# Patient Record
Sex: FEMALE | Race: WHITE | Employment: OTHER | URBAN - METROPOLITAN AREA
[De-identification: names, ages, dates, MRNs, and addresses within clinical notes are randomized per-mention and may not be internally consistent; named-entity substitution may affect disease eponyms.]

---

## 2017-11-15 ENCOUNTER — HOSPITAL ENCOUNTER (OUTPATIENT)
Dept: MAMMOGRAPHY | Facility: CLINIC | Age: 82
Discharge: HOME | End: 2017-11-15
Attending: FAMILY MEDICINE | Admitting: FAMILY MEDICINE
Payer: MEDICARE

## 2017-11-15 DIAGNOSIS — Z12.31 VISIT FOR SCREENING MAMMOGRAM: ICD-10-CM

## 2017-11-15 PROCEDURE — 77063 BREAST TOMOSYNTHESIS BI: CPT

## 2017-11-15 PROCEDURE — G0202 SCR MAMMO BI INCL CAD: HCPCS

## 2019-02-03 ENCOUNTER — APPOINTMENT (OUTPATIENT)
Dept: GENERAL RADIOLOGY | Facility: CLINIC | Age: 84
End: 2019-02-03
Payer: COMMERCIAL

## 2019-02-03 ENCOUNTER — HOSPITAL ENCOUNTER (EMERGENCY)
Facility: CLINIC | Age: 84
Discharge: HOME | End: 2019-02-03
Attending: EMERGENCY MEDICINE | Admitting: EMERGENCY MEDICINE
Payer: COMMERCIAL

## 2019-02-03 VITALS
SYSTOLIC BLOOD PRESSURE: 154 MMHG | BODY MASS INDEX: 20.55 KG/M2 | HEART RATE: 58 BPM | RESPIRATION RATE: 16 BRPM | TEMPERATURE: 97.9 F | WEIGHT: 116 LBS | DIASTOLIC BLOOD PRESSURE: 73 MMHG | OXYGEN SATURATION: 100 % | HEIGHT: 63 IN
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
## 2019-02-03 DIAGNOSIS — S22.32XA CLOSED FRACTURE OF ONE RIB OF LEFT SIDE, INITIAL ENCOUNTER: ICD-10-CM

## 2019-02-03 LAB
ALBUMIN SERPL-MCNC: 3.5 G/DL (ref 3.4–5)
ALBUMIN UR-MCNC: NEGATIVE MG/DL
ALP SERPL-CCNC: 37 U/L (ref 40–150)
ALT SERPL W P-5'-P-CCNC: 20 U/L (ref 0–50)
ANION GAP SERPL CALCULATED.3IONS-SCNC: 8 MMOL/L (ref 3–14)
APPEARANCE UR: CLEAR
AST SERPL W P-5'-P-CCNC: 11 U/L (ref 0–45)
BASOPHILS # BLD AUTO: 0 10E9/L (ref 0–0.2)
BASOPHILS NFR BLD AUTO: 0.2 %
BILIRUB SERPL-MCNC: 0.4 MG/DL (ref 0.2–1.3)
BILIRUB UR QL STRIP: NEGATIVE
BUN SERPL-MCNC: 14 MG/DL (ref 7–30)
CALCIUM SERPL-MCNC: 8.4 MG/DL (ref 8.5–10.1)
CHLORIDE SERPL-SCNC: 107 MMOL/L (ref 94–109)
CO2 SERPL-SCNC: 25 MMOL/L (ref 20–32)
COLOR UR AUTO: YELLOW
CREAT SERPL-MCNC: 0.69 MG/DL (ref 0.52–1.04)
DIFFERENTIAL METHOD BLD: NORMAL
EOSINOPHIL # BLD AUTO: 0.2 10E9/L (ref 0–0.7)
EOSINOPHIL NFR BLD AUTO: 2.5 %
ERYTHROCYTE [DISTWIDTH] IN BLOOD BY AUTOMATED COUNT: 13.9 % (ref 10–15)
GFR SERPL CREATININE-BSD FRML MDRD: 79 ML/MIN/{1.73_M2}
GLUCOSE SERPL-MCNC: 116 MG/DL (ref 70–99)
GLUCOSE UR STRIP-MCNC: NEGATIVE MG/DL
HCT VFR BLD AUTO: 35.8 % (ref 35–47)
HGB BLD-MCNC: 12 G/DL (ref 11.7–15.7)
HGB UR QL STRIP: NEGATIVE
HYALINE CASTS #/AREA URNS LPF: 1 /LPF (ref 0–2)
IMM GRANULOCYTES # BLD: 0 10E9/L (ref 0–0.4)
IMM GRANULOCYTES NFR BLD: 0.4 %
KETONES UR STRIP-MCNC: NEGATIVE MG/DL
LEUKOCYTE ESTERASE UR QL STRIP: ABNORMAL
LYMPHOCYTES # BLD AUTO: 1.9 10E9/L (ref 0.8–5.3)
LYMPHOCYTES NFR BLD AUTO: 23 %
MCH RBC QN AUTO: 29.7 PG (ref 26.5–33)
MCHC RBC AUTO-ENTMCNC: 33.5 G/DL (ref 31.5–36.5)
MCV RBC AUTO: 89 FL (ref 78–100)
MONOCYTES # BLD AUTO: 0.9 10E9/L (ref 0–1.3)
MONOCYTES NFR BLD AUTO: 11 %
MUCOUS THREADS #/AREA URNS LPF: PRESENT /LPF
NEUTROPHILS # BLD AUTO: 5.2 10E9/L (ref 1.6–8.3)
NEUTROPHILS NFR BLD AUTO: 62.9 %
NITRATE UR QL: NEGATIVE
NRBC # BLD AUTO: 0 10*3/UL
NRBC BLD AUTO-RTO: 0 /100
PH UR STRIP: 7 PH (ref 5–7)
PLATELET # BLD AUTO: 237 10E9/L (ref 150–450)
POTASSIUM SERPL-SCNC: 3.1 MMOL/L (ref 3.4–5.3)
PROT SERPL-MCNC: 6.8 G/DL (ref 6.8–8.8)
RBC # BLD AUTO: 4.04 10E12/L (ref 3.8–5.2)
RBC #/AREA URNS AUTO: <1 /HPF (ref 0–2)
SODIUM SERPL-SCNC: 140 MMOL/L (ref 133–144)
SOURCE: ABNORMAL
SP GR UR STRIP: 1.01 (ref 1–1.03)
SQUAMOUS #/AREA URNS AUTO: <1 /HPF (ref 0–1)
UROBILINOGEN UR STRIP-MCNC: NORMAL MG/DL (ref 0–2)
WBC # BLD AUTO: 8.3 10E9/L (ref 4–11)
WBC #/AREA URNS AUTO: 3 /HPF (ref 0–5)

## 2019-02-03 PROCEDURE — 85025 COMPLETE CBC W/AUTO DIFF WBC: CPT | Performed by: EMERGENCY MEDICINE

## 2019-02-03 PROCEDURE — 81001 URINALYSIS AUTO W/SCOPE: CPT | Performed by: EMERGENCY MEDICINE

## 2019-02-03 PROCEDURE — 99285 EMERGENCY DEPT VISIT HI MDM: CPT | Mod: 25

## 2019-02-03 PROCEDURE — 76705 ECHO EXAM OF ABDOMEN: CPT

## 2019-02-03 PROCEDURE — A9270 NON-COVERED ITEM OR SERVICE: HCPCS | Mod: GY | Performed by: EMERGENCY MEDICINE

## 2019-02-03 PROCEDURE — 80053 COMPREHEN METABOLIC PANEL: CPT | Performed by: EMERGENCY MEDICINE

## 2019-02-03 PROCEDURE — 71046 X-RAY EXAM CHEST 2 VIEWS: CPT

## 2019-02-03 PROCEDURE — 25000132 ZZH RX MED GY IP 250 OP 250 PS 637: Mod: GY | Performed by: EMERGENCY MEDICINE

## 2019-02-03 PROCEDURE — 73502 X-RAY EXAM HIP UNI 2-3 VIEWS: CPT

## 2019-02-03 RX ORDER — HYDROCODONE BITARTRATE AND ACETAMINOPHEN 5; 325 MG/1; MG/1
1-2 TABLET ORAL EVERY 4 HOURS PRN
Qty: 15 TABLET | Refills: 0 | Status: SHIPPED | OUTPATIENT
Start: 2019-02-03 | End: 2019-03-05

## 2019-02-03 RX ORDER — OXYCODONE HYDROCHLORIDE 5 MG/1
5 TABLET ORAL ONCE
Status: DISCONTINUED | OUTPATIENT
Start: 2019-02-03 | End: 2019-02-03 | Stop reason: HOSPADM

## 2019-02-03 RX ORDER — OXYCODONE AND ACETAMINOPHEN 5; 325 MG/1; MG/1
1 TABLET ORAL ONCE
Status: COMPLETED | OUTPATIENT
Start: 2019-02-03 | End: 2019-02-03

## 2019-02-03 RX ORDER — ACETAMINOPHEN 325 MG/1
325 TABLET ORAL ONCE
Status: COMPLETED | OUTPATIENT
Start: 2019-02-03 | End: 2019-02-03

## 2019-02-03 RX ORDER — ACETAMINOPHEN 325 MG/1
325 TABLET ORAL ONCE
Status: DISCONTINUED | OUTPATIENT
Start: 2019-02-03 | End: 2019-02-03 | Stop reason: HOSPADM

## 2019-02-03 RX ADMIN — OXYCODONE HYDROCHLORIDE AND ACETAMINOPHEN 1 TABLET: 5; 325 TABLET ORAL at 08:16

## 2019-02-03 RX ADMIN — ACETAMINOPHEN 325 MG: 325 TABLET, FILM COATED ORAL at 08:16

## 2019-02-03 ASSESSMENT — ENCOUNTER SYMPTOMS
WEAKNESS: 0
NUMBNESS: 0
BACK PAIN: 0
ARTHRALGIAS: 1
MYALGIAS: 1
WOUND: 0
NECK PAIN: 0
HEADACHES: 0
ABDOMINAL PAIN: 1

## 2019-02-03 ASSESSMENT — MIFFLIN-ST. JEOR: SCORE: 940.3

## 2019-02-03 NOTE — DISCHARGE INSTRUCTIONS
Should apply ice to your back for comfort.  You should use the Norco as prescribed for severe pain.  You can also add in 325-650 mg of Tylenol up to every 6 hours.  He should return to the emergency department if you have increasing pain, fevers, trouble breathing or feel dizzy or lightheaded.  Otherwise, you can make an appointment follow-up with your primary care doctor for recheck.

## 2019-02-03 NOTE — ED AVS SNAPSHOT
Emergency Department  [address] [facility] [place] [address]  Phone:  [phone]  Fax:  [phone]                                    Verito Chicas   MRN: [id number]    Department:   Emergency Department   Date of Visit:  2/3/2019           After Visit Summary Signature Page    I have received my discharge instructions, and my questions have been answered. I have discussed any challenges I see with this plan with the nurse or doctor.    ..........................................................................................................................................  Patient/Patient Representative Signature      ..........................................................................................................................................  Patient Representative Print Name and Relationship to Patient    ..................................................               ................................................  Date                                   Time    ..........................................................................................................................................  Reviewed by Signature/Title    ...................................................              ..............................................  Date                                               Time          22EPIC Rev 08/18

## 2019-02-03 NOTE — ED PROVIDER NOTES
"  History     Chief Complaint:  Back Pain      HPI   Verito Chicas is a 85 year old female who presents with fall and left hip pain. The patient states that Friday, 3 days ago now, she was walking up her basement stairs when she stumbled and fell forward into the stairs, landing on her left hip and side primarily. She denies hitting her head or losing consciousness but since the incident has had a left upper hip/side pain that hurts whenever she ambulates. The patient otherwise denies any pain in her neck, head, or back. She has no numbness or weakness in the leg and has still been able to walk albeit with some pain.    Allergies:  No known drug allergies     Medications:    Amlodipine  Cymbalta  Hydrochlorothiazide  Labetalol  Lisinopril   Omeprazole  Simvastatin     Past Medical History:    GERD  Hypertension     Past Surgical History:    Appendectomy  Right breast lumpectomy  Dental extraction  Cataract surgery bilateral  Left knee arthroscopy  Left shoulder surgery  Left wrist surgery     Family History:    Colon cancer     Social History:  Smoking Status: Light tobacco Smoker  Alcohol Use: No  Patient presents with .   Marital Status:       Review of Systems   Gastrointestinal: Positive for abdominal pain.   Musculoskeletal: Positive for arthralgias, gait problem and myalgias. Negative for back pain and neck pain.   Skin: Negative for wound.   Neurological: Negative for syncope, weakness, numbness and headaches.   All other systems reviewed and are negative.      Physical Exam   First Vitals:  BP: 170/78  Pulse: 58  Heart Rate: 76  Temp: 97.9  F (36.6  C)  Resp: 16  Height: 160 cm (5' 3\")  Weight: 52.6 kg (116 lb)  SpO2: 96 %      Physical Exam  Constitutional: Alert, attentive, GCS 15  HENT:    Nose: Nose normal.    Mouth/Throat: Oropharynx is clear, mucous membranes are moist  Eyes: EOM are normal, anicteric, conjugate gaze  CV: regular rate and rhythm; no murmurs  Chest: Effort normal " and breath sounds clear without wheezing or rales, symmetric bilaterally   GI:  non tender. No distension. No guarding or rebound.    MSK: Initially tender on the left iliac crest and lateral hip, on repeat evaluation, more tender along the left paraspinal muscle and left last lower posterior rib.  No knee tenderness, passive range of motion of the left hip full.  No midline spinal tenderness.  Neurological: Alert, attentive, moving all extremities equally.   Skin: Skin is warm and dry.      Emergency Department Course     Imaging:  Radiographic findings were communicated with the patient who voiced understanding of the findings.    X-ray Pelvis and Left hip, 1 view:  No evidence of femur or femoral neck fracture. No pelvic  fracture. Degenerative changes in the lower lumbar spine.  Result per radiology.      X-ray Chest, 2 views:  Heart size is upper normal. Pulmonary vasculature is not  distended. No focal infiltrates or evidence of pleural fluid. No  pneumothorax. There is rib irregularity involving the left posterior  ninth rib. This is likely an old healed rib fracture that was seen on  the 2009 comparison study. No evidence of acute fracture.  Result per radiology.     Laboratory:  CBC: WNL (WBC 8.3, HGB 12.0, )   CMP: K 3.1 (L), Glucose 116 (H), Ca 8.4 (L), Alkphos 37 (L), o/w WNL (Creatinine 0.69)   UA: o/w negative    Procedures:    PROCEDURE NOTE: ED BEDSIDE LIMITED ULTRASOUND  Name of the study: E-FAST Exam  Performed by: Feng Maldonado MD  Indications:Blunt Trauma   Body Location / Organs Imaged: Three quadrants (perihepatic, perisplenic, pelvic) of the abdomen were scanned.  Findings: Three quadrants were negative for free fluid.    Interpretation: No evidence of acute hemoperitoneum.  Archiving of images: Images were saved digitally to the internal hard drive of the ultrasound machine/PACS.  Interventions:  0816 - Tylenol 325 mg PO  0816 - Percocet 5-325 mg tablet, 1 tablet PO     Emergency  Department Course:  Past medical records, nursing notes, and vitals reviewed.  0807: I performed an exam of the patient and obtained history, as documented above.      IV inserted and blood drawn.     The patient was sent for a X-ray while in the emergency department, findings above.      1035: I rechecked patient and explained findings.    1248: Bedside ultrasound obtained as noted above.    1255: I rechecked the patient. Findings and plan explained to the Patient. Patient discharged home with instructions regarding supportive care, medications, and reasons to return. The importance of close follow-up was reviewed.      Impression & Plan      Medical Decision Makin-year-old woman with past medical history significant for high blood pressure, hyperlipidemia presenting for evaluation of left side pain after mechanical fall 2 days prior in which she was walking up the stairs, lost her footing and landed on her left side against the stairs.  No LOC, has no neck pain or range of motion.  Initially her exam was concerning for possible left hip or pelvis injury though fracture seems less likely given patient has been able to walk without much difficulty over the last 2 days mainly reports pain with getting in and out of bed or standing from seated position.  Initial x-rays of the pelvis were negative and on repeat examination patient actually was able to better localize her pain while standing to the posterior left lower rib cage approximately rib 11.  Chest x-ray shows no evidence of pneumothorax and no obvious rib deformity so possible old healing rib fractures.  I did perform a bedside ultrasound after her urine was negative for blood which showed no evidence of free fluid and no overt subcapsular splenic injury.  I was able to isolate a small deformity in the posterior ribs suggestive of a rib fracture which fits clinically with her presentation especially given her negative hip and pelvis x-rays.  I recommended  a short course of pain medications and follow-up with her primary care doctor for recheck.  Return precautions were reviewed including increasing abdominal pain, fevers, dizziness or lightheadedness or chest pain, chest pressure or trouble breathing.  Patient and her  expressed understanding of this plan.  Patient was ambulatory with a steady gait without assistance at time of discharge.    Diagnosis:    ICD-10-CM    1. Closed fracture of one rib of left side, initial encounter S22.32XA    2. Fall, initial encounter W19.XXXA      Discharge Medications:     HYDROcodone-acetaminophen 5-325 MG tablet  Commonly known as:  NORCO  1-2 tablets, Oral, EVERY 4 HOURS PRN  What changed:      reasons to take this    Another medication with the same name was removed. Continue taking this medication, and follow the directions you see here.          Feng Maldonado MD   [employer]  9:36 AM 02/04/19       eMkhi Douglas  2/3/2019    EMERGENCY DEPARTMENT  IMekhi, brandyn serving as a scribe at 8:07 AM on 2/3/2019 to document services personally performed by Feng Maldonado MD based on my observations and the provider's statements to me.       Feng Maldonado MD  02/04/19 0936

## 2019-03-11 ENCOUNTER — HOSPITAL ENCOUNTER (OUTPATIENT)
Facility: CLINIC | Age: 84
Discharge: HOME | End: 2019-03-11
Attending: COLON & RECTAL SURGERY | Admitting: COLON & RECTAL SURGERY
Payer: COMMERCIAL

## 2019-03-11 VITALS
RESPIRATION RATE: 13 BRPM | DIASTOLIC BLOOD PRESSURE: 83 MMHG | SYSTOLIC BLOOD PRESSURE: 159 MMHG | BODY MASS INDEX: 20.55 KG/M2 | OXYGEN SATURATION: 95 % | HEIGHT: 63 IN | WEIGHT: 116 LBS | HEART RATE: 61 BPM

## 2019-03-11 LAB — COLONOSCOPY: NORMAL

## 2019-03-11 PROCEDURE — 45385 COLONOSCOPY W/LESION REMOVAL: CPT | Mod: PT | Performed by: COLON & RECTAL SURGERY

## 2019-03-11 PROCEDURE — 88305 TISSUE EXAM BY PATHOLOGIST: CPT | Mod: 26 | Performed by: COLON & RECTAL SURGERY

## 2019-03-11 PROCEDURE — 25000128 H RX IP 250 OP 636: Performed by: COLON & RECTAL SURGERY

## 2019-03-11 PROCEDURE — G0500 MOD SEDAT ENDO SERVICE >5YRS: HCPCS | Performed by: COLON & RECTAL SURGERY

## 2019-03-11 PROCEDURE — 88305 TISSUE EXAM BY PATHOLOGIST: CPT | Performed by: COLON & RECTAL SURGERY

## 2019-03-11 RX ORDER — NALOXONE HYDROCHLORIDE 0.4 MG/ML
.1-.4 INJECTION, SOLUTION INTRAMUSCULAR; INTRAVENOUS; SUBCUTANEOUS
Status: DISCONTINUED | OUTPATIENT
Start: 2019-03-11 | End: 2019-03-11 | Stop reason: HOSPADM

## 2019-03-11 RX ORDER — ONDANSETRON 2 MG/ML
4 INJECTION INTRAMUSCULAR; INTRAVENOUS EVERY 6 HOURS PRN
Status: DISCONTINUED | OUTPATIENT
Start: 2019-03-11 | End: 2019-03-11 | Stop reason: HOSPADM

## 2019-03-11 RX ORDER — FLUMAZENIL 0.1 MG/ML
0.2 INJECTION, SOLUTION INTRAVENOUS
Status: DISCONTINUED | OUTPATIENT
Start: 2019-03-11 | End: 2019-03-11 | Stop reason: HOSPADM

## 2019-03-11 RX ORDER — DIPHENHYDRAMINE HCL 25 MG
25 CAPSULE ORAL EVERY 4 HOURS PRN
Status: DISCONTINUED | OUTPATIENT
Start: 2019-03-11 | End: 2019-03-11 | Stop reason: HOSPADM

## 2019-03-11 RX ORDER — FENTANYL CITRATE 50 UG/ML
INJECTION, SOLUTION INTRAMUSCULAR; INTRAVENOUS PRN
Status: DISCONTINUED | OUTPATIENT
Start: 2019-03-11 | End: 2019-03-11 | Stop reason: HOSPADM

## 2019-03-11 RX ORDER — ONDANSETRON 2 MG/ML
4 INJECTION INTRAMUSCULAR; INTRAVENOUS
Status: DISCONTINUED | OUTPATIENT
Start: 2019-03-11 | End: 2019-03-11 | Stop reason: HOSPADM

## 2019-03-11 RX ORDER — DIPHENHYDRAMINE HYDROCHLORIDE 50 MG/ML
25 INJECTION INTRAMUSCULAR; INTRAVENOUS EVERY 4 HOURS PRN
Status: DISCONTINUED | OUTPATIENT
Start: 2019-03-11 | End: 2019-03-11 | Stop reason: HOSPADM

## 2019-03-11 RX ORDER — PROCHLORPERAZINE MALEATE 5 MG
5 TABLET ORAL EVERY 6 HOURS PRN
Status: DISCONTINUED | OUTPATIENT
Start: 2019-03-11 | End: 2019-03-11 | Stop reason: HOSPADM

## 2019-03-11 RX ORDER — ONDANSETRON 4 MG/1
4 TABLET, ORALLY DISINTEGRATING ORAL EVERY 6 HOURS PRN
Status: DISCONTINUED | OUTPATIENT
Start: 2019-03-11 | End: 2019-03-11 | Stop reason: HOSPADM

## 2019-03-11 RX ORDER — LIDOCAINE 40 MG/G
CREAM TOPICAL
Status: DISCONTINUED | OUTPATIENT
Start: 2019-03-11 | End: 2019-03-11 | Stop reason: HOSPADM

## 2019-03-11 ASSESSMENT — MIFFLIN-ST. JEOR: SCORE: 940.3

## 2019-03-11 NOTE — H&P
Pre-Endoscopy History and Physical     Verito Chicas MRN# [id number]   YOB: 1933 Age: 85 year old     Date of Procedure: 3/11/2019  Primary care provider: Skylar Douglas  Type of Endoscopy: colonoscopy  Reason for Procedure: surveillance  Type of Anesthesia Anticipated: Moderate Sedation    HPI:    Verito is a 85 year old female who will be undergoing the above procedure.      A history and physical has been performed. The patient's medications and allergies have been reviewed. The risks and benefits of the procedure including the risk of bleeding, perforation, and missed lesions as well as the sedation options and risks were discussed with the patient.  All questions were answered and informed consent was obtained.      No Known Allergies     Current Facility-Administered Medications   Medication     lidocaine (LMX4) cream     lidocaine 1 % 0.1-1 mL     ondansetron (ZOFRAN) injection 4 mg     sodium chloride (PF) 0.9% PF flush 3 mL     sodium chloride (PF) 0.9% PF flush 3 mL       Medications Prior to Admission   Medication Sig Dispense Refill Last Dose     AMLODIPINE BESYLATE PO Take 5 mg by mouth daily 1/2 of 10 mg tablet   3/10/2019 at Unknown time     DULoxetine HCl (CYMBALTA PO) Take 30 mg by mouth daily   3/10/2019 at Unknown time     HYDROCHLOROTHIAZIDE PO Take 25 mg by mouth daily   3/10/2019 at Unknown time     IBUPROFEN PO Take 800 mg by mouth 3 times daily as needed for moderate pain 4 x 200 mg   Past Week at Unknown time     LABETALOL HCL PO Take 400 mg by mouth 2 times daily 2 x 200 mg   3/10/2019 at Unknown time     LISINOPRIL PO Take 40 mg by mouth daily   3/10/2019 at Unknown time     Omega-3 Fatty Acids (OMEGA-3 FISH OIL PO) Take 1 capsule by mouth daily Strength unknown   Past Week at Unknown time     OMEPRAZOLE PO Take 40 mg by mouth daily    3/10/2019 at Unknown time     SIMVASTATIN PO Take 40 mg by mouth At Bedtime   3/10/2019 at Unknown time     amoxicillin  "(AMOXIL) 500 MG tablet Take 1 tablet (500 mg) by mouth 3 times daily 30 tablet 0 Unknown at Unknown time     ASPIRIN EC PO Take 81 mg by mouth daily   Unknown at Unknown time     chlorhexidine (PERIDEX) 0.12 % solution Swish and spit 15 mLs in mouth 2 times daily Swish x 1 minute and then spit every morning and night x 1 week.   Unknown at Unknown time     [] HYDROcodone-acetaminophen (NORCO) 5-325 MG tablet Take 1-2 tablets by mouth every 4 hours as needed for pain 15 tablet 0      multivitamin, therapeutic with minerals (THERA-VIT-M) TABS Take 1 tablet by mouth daily   Unknown at Unknown time       Patient Active Problem List   Diagnosis     Facial edema        Past Medical History:   Diagnosis Date     GERD (gastroesophageal reflux disease)      Hypertension         Past Surgical History:   Procedure Laterality Date     APPENDECTOMY       BIOPSY      lumpectomy right breast     COLONOSCOPY N/A 2016    Procedure: COMBINED COLONOSCOPY, SINGLE OR MULTIPLE BIOPSY/POLYPECTOMY BY BIOPSY;  Surgeon: Flynn Pineda MD;  Location:  GI     EXTRACTION(S) DENTAL N/A 2015    Procedure: EXTRACTION(S) DENTAL;  Surgeon: Fahad Webster DDS;  Location:  OR     EYE SURGERY      cataract bilat     ORTHOPEDIC SURGERY      arthroscopy knee left     ORTHOPEDIC SURGERY      shoulder surg, fx left     ORTHOPEDIC SURGERY      fx wrist left       Social History     Tobacco Use     Smoking status: Light Tobacco Smoker     Types: Cigarettes     Smokeless tobacco: Never Used   Substance Use Topics     Alcohol use: No       Family History   Problem Relation Age of Onset     Colon Cancer Brother          PHYSICAL EXAM:   /81   Pulse 58   Resp 18   Ht 1.6 m (5' 3\")   Wt 52.6 kg (116 lb)   SpO2 97%   BMI 20.55 kg/m   Estimated body mass index is 20.55 kg/m  as calculated from the following:    Height as of this encounter: 1.6 m (5' 3\").    Weight as of this encounter: 52.6 kg (116 lb).   Mental status - " alert and oriented  RESP: lungs clear  CV: RRR  AIRWAY EXAM: Mallampatti Class II (visualization of the soft palate, fauces, and uvula)    IMPRESSION   ASA Class 2 - Mild systemic disease      Signed Electronically by: Flynn Pineda  March 11, 2019    Colorectal Surgery  [phone] (office)  [phone] (pager)  [email or web address]

## 2019-03-13 LAB — COPATH REPORT: NORMAL

## 2019-06-17 ENCOUNTER — HOSPITAL ENCOUNTER (OUTPATIENT)
Facility: CLINIC | Age: 84
Discharge: HOME | End: 2019-06-17
Attending: COLON & RECTAL SURGERY | Admitting: COLON & RECTAL SURGERY
Payer: COMMERCIAL

## 2019-06-17 VITALS
RESPIRATION RATE: 8 BRPM | SYSTOLIC BLOOD PRESSURE: 109 MMHG | DIASTOLIC BLOOD PRESSURE: 58 MMHG | WEIGHT: 100 LBS | HEIGHT: 63 IN | HEART RATE: 51 BPM | BODY MASS INDEX: 17.72 KG/M2 | OXYGEN SATURATION: 93 %

## 2019-06-17 LAB — COLONOSCOPY: NORMAL

## 2019-06-17 PROCEDURE — 88305 TISSUE EXAM BY PATHOLOGIST: CPT | Performed by: COLON & RECTAL SURGERY

## 2019-06-17 PROCEDURE — 45381 COLONOSCOPY SUBMUCOUS NJX: CPT | Mod: PT | Performed by: COLON & RECTAL SURGERY

## 2019-06-17 PROCEDURE — 88305 TISSUE EXAM BY PATHOLOGIST: CPT | Mod: 26 | Performed by: COLON & RECTAL SURGERY

## 2019-06-17 PROCEDURE — 99153 MOD SED SAME PHYS/QHP EA: CPT | Performed by: COLON & RECTAL SURGERY

## 2019-06-17 PROCEDURE — 45385 COLONOSCOPY W/LESION REMOVAL: CPT | Performed by: COLON & RECTAL SURGERY

## 2019-06-17 PROCEDURE — 25000128 H RX IP 250 OP 636: Performed by: COLON & RECTAL SURGERY

## 2019-06-17 PROCEDURE — 25800030 ZZH RX IP 258 OP 636: Performed by: COLON & RECTAL SURGERY

## 2019-06-17 PROCEDURE — G0500 MOD SEDAT ENDO SERVICE >5YRS: HCPCS | Performed by: COLON & RECTAL SURGERY

## 2019-06-17 RX ORDER — SODIUM CHLORIDE 9 MG/ML
INJECTION, SOLUTION INTRAVENOUS CONTINUOUS PRN
Status: DISCONTINUED | OUTPATIENT
Start: 2019-06-17 | End: 2019-06-17 | Stop reason: HOSPADM

## 2019-06-17 RX ORDER — ONDANSETRON 2 MG/ML
4 INJECTION INTRAMUSCULAR; INTRAVENOUS
Status: DISCONTINUED | OUTPATIENT
Start: 2019-06-17 | End: 2019-06-17 | Stop reason: HOSPADM

## 2019-06-17 RX ORDER — FENTANYL CITRATE 50 UG/ML
INJECTION, SOLUTION INTRAMUSCULAR; INTRAVENOUS PRN
Status: DISCONTINUED | OUTPATIENT
Start: 2019-06-17 | End: 2019-06-17 | Stop reason: HOSPADM

## 2019-06-17 RX ORDER — LIDOCAINE 40 MG/G
CREAM TOPICAL
Status: DISCONTINUED | OUTPATIENT
Start: 2019-06-17 | End: 2019-06-17 | Stop reason: HOSPADM

## 2019-06-17 ASSESSMENT — MIFFLIN-ST. JEOR: SCORE: 867.73

## 2019-06-17 NOTE — H&P
Pre-Endoscopy History and Physical     Verito Chicas MRN# [id number]   YOB: 1933 Age: 85 year old     Date of Procedure: 6/17/2019  Primary care provider: Skylar Douglas  Type of Endoscopy: colonoscopy  Reason for Procedure: surveillance  Type of Anesthesia Anticipated: Moderate Sedation    HPI:    Verito is a 85 year old female who will be undergoing the above procedure.      A history and physical has been performed. The patient's medications and allergies have been reviewed. The risks and benefits of the procedure including the risk of bleeding, perforation, and missed lesions as well as the sedation options and risks were discussed with the patient.  All questions were answered and informed consent was obtained.      No Known Allergies     Current Facility-Administered Medications   Medication     lidocaine (LMX4) cream     lidocaine 1 % 0.1-1 mL     ondansetron (ZOFRAN) injection 4 mg     sodium chloride (PF) 0.9% PF flush 3 mL     sodium chloride (PF) 0.9% PF flush 3 mL     sodium chloride 0.9% infusion       Medications Prior to Admission   Medication Sig Dispense Refill Last Dose     AMLODIPINE BESYLATE PO Take 5 mg by mouth daily 1/2 of 10 mg tablet   6/17/2019 at Unknown time     ASPIRIN EC PO Take 81 mg by mouth daily   Past Week at Unknown time     DULoxetine HCl (CYMBALTA PO) Take 30 mg by mouth daily   6/17/2019 at Unknown time     HYDROCHLOROTHIAZIDE PO Take 25 mg by mouth daily   6/17/2019 at Unknown time     IBUPROFEN PO Take 800 mg by mouth 3 times daily as needed for moderate pain 4 x 200 mg   Past Month at Unknown time     LABETALOL HCL PO Take 400 mg by mouth 2 times daily 2 x 200 mg   6/17/2019 at Unknown time     LISINOPRIL PO Take 40 mg by mouth daily   6/17/2019 at Unknown time     multivitamin, therapeutic with minerals (THERA-VIT-M) TABS Take 1 tablet by mouth daily   Past Week at Unknown time     Omega-3 Fatty Acids (OMEGA-3 FISH OIL PO) Take 1 capsule by  "mouth daily Strength unknown   Past Week at Unknown time     OMEPRAZOLE PO Take 40 mg by mouth daily    2019 at Unknown time     SIMVASTATIN PO Take 40 mg by mouth At Bedtime   2019 at Unknown time     amoxicillin (AMOXIL) 500 MG tablet Take 1 tablet (500 mg) by mouth 3 times daily 30 tablet 0 More than a month at Unknown time     chlorhexidine (PERIDEX) 0.12 % solution Swish and spit 15 mLs in mouth 2 times daily Swish x 1 minute and then spit every morning and night x 1 week.   Unknown at Unknown time     [] HYDROcodone-acetaminophen (NORCO) 5-325 MG tablet Take 1-2 tablets by mouth every 4 hours as needed for pain 15 tablet 0        Patient Active Problem List   Diagnosis     Facial edema        Past Medical History:   Diagnosis Date     Cancer (H)     breast cancer     GERD (gastroesophageal reflux disease)      Hypertension         Past Surgical History:   Procedure Laterality Date     APPENDECTOMY       BIOPSY      lumpectomy right breast     COLONOSCOPY N/A 2016    Procedure: COMBINED COLONOSCOPY, SINGLE OR MULTIPLE BIOPSY/POLYPECTOMY BY BIOPSY;  Surgeon: Flynn Pineda MD;  Location:  GI     EXTRACTION(S) DENTAL N/A 2015    Procedure: EXTRACTION(S) DENTAL;  Surgeon: Fahad Webster DDS;  Location:  OR     EYE SURGERY      cataract bilat     ORTHOPEDIC SURGERY      arthroscopy knee left     ORTHOPEDIC SURGERY      shoulder surg, fx left     ORTHOPEDIC SURGERY      fx wrist left       Social History     Tobacco Use     Smoking status: Light Tobacco Smoker     Types: Cigarettes     Smokeless tobacco: Never Used   Substance Use Topics     Alcohol use: No       Family History   Problem Relation Age of Onset     Colon Cancer Brother          PHYSICAL EXAM:   /60   Resp 16   Ht 1.6 m (5' 3\")   Wt 45.4 kg (100 lb)   SpO2 96%   BMI 17.71 kg/m   Estimated body mass index is 17.71 kg/m  as calculated from the following:    Height as of this encounter: 1.6 m (5' 3\").    " Weight as of this encounter: 45.4 kg (100 lb).   Mental status - alert and oriented  RESP: lungs clear  CV: RRR  AIRWAY EXAM: Mallampatti Class II (visualization of the soft palate, fauces, and uvula)    IMPRESSION   ASA Class 2 - Mild systemic disease      Signed Electronically by: Flynn Pindea  June 17, 2019    Colorectal Surgery  [phone] (office)  [phone] (pager)  [email or web address]

## 2019-06-18 LAB — COPATH REPORT: NORMAL

## 2019-10-13 ENCOUNTER — HOSPITAL ENCOUNTER (OUTPATIENT)
Facility: CLINIC | Age: 84
Setting detail: OBSERVATION
Discharge: HOME | End: 2019-10-14
Attending: EMERGENCY MEDICINE | Admitting: HOSPITALIST
Payer: COMMERCIAL

## 2019-10-13 ENCOUNTER — APPOINTMENT (OUTPATIENT)
Dept: CT IMAGING | Facility: CLINIC | Age: 84
End: 2019-10-13
Attending: EMERGENCY MEDICINE
Payer: COMMERCIAL

## 2019-10-13 DIAGNOSIS — F03.90 SENILE DEMENTIA WITHOUT BEHAVIORAL DISTURBANCE (H): Primary | ICD-10-CM

## 2019-10-13 DIAGNOSIS — K52.9 NON-SPECIFIC COLITIS: ICD-10-CM

## 2019-10-13 PROBLEM — K92.2 GI BLEED: Status: ACTIVE | Noted: 2019-10-13

## 2019-10-13 LAB
ABO + RH BLD: NORMAL
ABO + RH BLD: NORMAL
ALBUMIN SERPL-MCNC: 3.4 G/DL (ref 3.4–5)
ALP SERPL-CCNC: 76 U/L (ref 40–150)
ALT SERPL W P-5'-P-CCNC: 19 U/L (ref 0–50)
ANION GAP SERPL CALCULATED.3IONS-SCNC: 6 MMOL/L (ref 3–14)
AST SERPL W P-5'-P-CCNC: 18 U/L (ref 0–45)
BASOPHILS # BLD AUTO: 0 10E9/L (ref 0–0.2)
BASOPHILS NFR BLD AUTO: 0.1 %
BILIRUB SERPL-MCNC: 0.6 MG/DL (ref 0.2–1.3)
BLD GP AB SCN SERPL QL: NORMAL
BLOOD BANK CMNT PATIENT-IMP: NORMAL
BUN SERPL-MCNC: 18 MG/DL (ref 7–30)
CALCIUM SERPL-MCNC: 8.3 MG/DL (ref 8.5–10.1)
CHLORIDE SERPL-SCNC: 105 MMOL/L (ref 94–109)
CO2 BLDCOV-SCNC: 25 MMOL/L (ref 21–28)
CO2 SERPL-SCNC: 26 MMOL/L (ref 20–32)
CREAT SERPL-MCNC: 1.02 MG/DL (ref 0.52–1.04)
DIFFERENTIAL METHOD BLD: ABNORMAL
EOSINOPHIL # BLD AUTO: 0.2 10E9/L (ref 0–0.7)
EOSINOPHIL NFR BLD AUTO: 1.3 %
ERYTHROCYTE [DISTWIDTH] IN BLOOD BY AUTOMATED COUNT: 14.6 % (ref 10–15)
GFR SERPL CREATININE-BSD FRML MDRD: 50 ML/MIN/{1.73_M2}
GLUCOSE SERPL-MCNC: 111 MG/DL (ref 70–99)
HCT VFR BLD AUTO: 35.3 % (ref 35–47)
HGB BLD-MCNC: 10.8 G/DL (ref 11.7–15.7)
HGB BLD-MCNC: 11.8 G/DL (ref 11.7–15.7)
IMM GRANULOCYTES # BLD: 0.1 10E9/L (ref 0–0.4)
IMM GRANULOCYTES NFR BLD: 0.3 %
INR PPP: 1.04 (ref 0.86–1.14)
LACTATE BLD-SCNC: 0.6 MMOL/L (ref 0.7–2.1)
LYMPHOCYTES # BLD AUTO: 3.1 10E9/L (ref 0.8–5.3)
LYMPHOCYTES NFR BLD AUTO: 18.1 %
MCH RBC QN AUTO: 29.4 PG (ref 26.5–33)
MCHC RBC AUTO-ENTMCNC: 33.4 G/DL (ref 31.5–36.5)
MCV RBC AUTO: 88 FL (ref 78–100)
MONOCYTES # BLD AUTO: 1.8 10E9/L (ref 0–1.3)
MONOCYTES NFR BLD AUTO: 10.6 %
NEUTROPHILS # BLD AUTO: 11.8 10E9/L (ref 1.6–8.3)
NEUTROPHILS NFR BLD AUTO: 69.6 %
NRBC # BLD AUTO: 0 10*3/UL
NRBC BLD AUTO-RTO: 0 /100
PCO2 BLDV: 42 MM HG (ref 40–50)
PH BLDV: 7.38 PH (ref 7.32–7.43)
PLATELET # BLD AUTO: 259 10E9/L (ref 150–450)
PO2 BLDV: 33 MM HG (ref 25–47)
POTASSIUM SERPL-SCNC: 3.2 MMOL/L (ref 3.4–5.3)
PROT SERPL-MCNC: 6.7 G/DL (ref 6.8–8.8)
RBC # BLD AUTO: 4.02 10E12/L (ref 3.8–5.2)
SAO2 % BLDV FROM PO2: 62 %
SODIUM SERPL-SCNC: 137 MMOL/L (ref 133–144)
SPECIMEN EXP DATE BLD: NORMAL
WBC # BLD AUTO: 17 10E9/L (ref 4–11)

## 2019-10-13 PROCEDURE — 80053 COMPREHEN METABOLIC PANEL: CPT | Performed by: EMERGENCY MEDICINE

## 2019-10-13 PROCEDURE — 85025 COMPLETE CBC W/AUTO DIFF WBC: CPT | Performed by: EMERGENCY MEDICINE

## 2019-10-13 PROCEDURE — 25000132 ZZH RX MED GY IP 250 OP 250 PS 637: Performed by: PHYSICIAN ASSISTANT

## 2019-10-13 PROCEDURE — 99285 EMERGENCY DEPT VISIT HI MDM: CPT | Mod: 25

## 2019-10-13 PROCEDURE — 83605 ASSAY OF LACTIC ACID: CPT

## 2019-10-13 PROCEDURE — 25000128 H RX IP 250 OP 636: Performed by: EMERGENCY MEDICINE

## 2019-10-13 PROCEDURE — C9113 INJ PANTOPRAZOLE SODIUM, VIA: HCPCS | Performed by: PHYSICIAN ASSISTANT

## 2019-10-13 PROCEDURE — 86901 BLOOD TYPING SEROLOGIC RH(D): CPT | Performed by: EMERGENCY MEDICINE

## 2019-10-13 PROCEDURE — 99220 ZZC INITIAL OBSERVATION CARE,LEVL III: CPT | Performed by: PHYSICIAN ASSISTANT

## 2019-10-13 PROCEDURE — 25000132 ZZH RX MED GY IP 250 OP 250 PS 637: Performed by: INTERNAL MEDICINE

## 2019-10-13 PROCEDURE — 25000128 H RX IP 250 OP 636: Performed by: PHYSICIAN ASSISTANT

## 2019-10-13 PROCEDURE — 74177 CT ABD & PELVIS W/CONTRAST: CPT

## 2019-10-13 PROCEDURE — 82803 BLOOD GASES ANY COMBINATION: CPT

## 2019-10-13 PROCEDURE — 36415 COLL VENOUS BLD VENIPUNCTURE: CPT | Performed by: PHYSICIAN ASSISTANT

## 2019-10-13 PROCEDURE — G0378 HOSPITAL OBSERVATION PER HR: HCPCS

## 2019-10-13 PROCEDURE — 25000125 ZZHC RX 250: Performed by: EMERGENCY MEDICINE

## 2019-10-13 PROCEDURE — 86850 RBC ANTIBODY SCREEN: CPT | Performed by: EMERGENCY MEDICINE

## 2019-10-13 PROCEDURE — 96374 THER/PROPH/DIAG INJ IV PUSH: CPT

## 2019-10-13 PROCEDURE — 85018 HEMOGLOBIN: CPT | Mod: 91 | Performed by: PHYSICIAN ASSISTANT

## 2019-10-13 PROCEDURE — 25800030 ZZH RX IP 258 OP 636: Performed by: PHYSICIAN ASSISTANT

## 2019-10-13 PROCEDURE — 85610 PROTHROMBIN TIME: CPT | Performed by: EMERGENCY MEDICINE

## 2019-10-13 PROCEDURE — 86900 BLOOD TYPING SEROLOGIC ABO: CPT | Performed by: EMERGENCY MEDICINE

## 2019-10-13 RX ORDER — ACETAMINOPHEN 500 MG
2 TABLET ORAL DAILY
Status: ON HOLD | COMMUNITY
End: 2021-07-07

## 2019-10-13 RX ORDER — IOPAMIDOL 755 MG/ML
58 INJECTION, SOLUTION INTRAVASCULAR ONCE
Status: COMPLETED | OUTPATIENT
Start: 2019-10-13 | End: 2019-10-13

## 2019-10-13 RX ORDER — CETIRIZINE HYDROCHLORIDE 10 MG/1
10 TABLET ORAL EVERY EVENING
COMMUNITY

## 2019-10-13 RX ORDER — HYDRALAZINE HYDROCHLORIDE 20 MG/ML
10 INJECTION INTRAMUSCULAR; INTRAVENOUS EVERY 4 HOURS PRN
Status: DISCONTINUED | OUTPATIENT
Start: 2019-10-13 | End: 2019-10-14 | Stop reason: HOSPADM

## 2019-10-13 RX ORDER — POTASSIUM CHLORIDE 1.5 G/1.58G
20 POWDER, FOR SOLUTION ORAL ONCE
Status: COMPLETED | OUTPATIENT
Start: 2019-10-13 | End: 2019-10-13

## 2019-10-13 RX ORDER — ONDANSETRON 2 MG/ML
4 INJECTION INTRAMUSCULAR; INTRAVENOUS EVERY 6 HOURS PRN
Status: DISCONTINUED | OUTPATIENT
Start: 2019-10-13 | End: 2019-10-14 | Stop reason: HOSPADM

## 2019-10-13 RX ORDER — LIDOCAINE 40 MG/G
CREAM TOPICAL
Status: DISCONTINUED | OUTPATIENT
Start: 2019-10-13 | End: 2019-10-14 | Stop reason: HOSPADM

## 2019-10-13 RX ORDER — ACETAMINOPHEN 325 MG/1
975 TABLET ORAL EVERY 8 HOURS PRN
Status: DISCONTINUED | OUTPATIENT
Start: 2019-10-13 | End: 2019-10-14 | Stop reason: HOSPADM

## 2019-10-13 RX ORDER — SODIUM CHLORIDE 9 MG/ML
INJECTION, SOLUTION INTRAVENOUS CONTINUOUS
Status: DISCONTINUED | OUTPATIENT
Start: 2019-10-13 | End: 2019-10-14 | Stop reason: HOSPADM

## 2019-10-13 RX ORDER — ONDANSETRON 4 MG/1
4 TABLET, ORALLY DISINTEGRATING ORAL EVERY 6 HOURS PRN
Status: DISCONTINUED | OUTPATIENT
Start: 2019-10-13 | End: 2019-10-14 | Stop reason: HOSPADM

## 2019-10-13 RX ORDER — NALOXONE HYDROCHLORIDE 0.4 MG/ML
.1-.4 INJECTION, SOLUTION INTRAMUSCULAR; INTRAVENOUS; SUBCUTANEOUS
Status: DISCONTINUED | OUTPATIENT
Start: 2019-10-13 | End: 2019-10-14

## 2019-10-13 RX ORDER — DONEPEZIL HYDROCHLORIDE 10 MG/1
TABLET, FILM COATED ORAL AT BEDTIME
Status: ON HOLD | COMMUNITY
End: 2019-10-14

## 2019-10-13 RX ADMIN — POTASSIUM CHLORIDE 20 MEQ: 1.5 POWDER, FOR SOLUTION ORAL at 23:27

## 2019-10-13 RX ADMIN — SODIUM CHLORIDE: 9 INJECTION, SOLUTION INTRAVENOUS at 21:29

## 2019-10-13 RX ADMIN — PANTOPRAZOLE SODIUM 40 MG: 40 INJECTION, POWDER, FOR SOLUTION INTRAVENOUS at 21:30

## 2019-10-13 RX ADMIN — MINERAL OIL, PETROLATUM: 425; 573 OINTMENT OPHTHALMIC at 19:38

## 2019-10-13 RX ADMIN — IOPAMIDOL 58 ML: 755 INJECTION, SOLUTION INTRAVENOUS at 14:51

## 2019-10-13 RX ADMIN — POLYETHYLENE GLYCOL 3350, SODIUM SULFATE ANHYDROUS, SODIUM BICARBONATE, SODIUM CHLORIDE, POTASSIUM CHLORIDE 4000 ML: 236; 22.74; 6.74; 5.86; 2.97 POWDER, FOR SOLUTION ORAL at 23:27

## 2019-10-13 RX ADMIN — SODIUM CHLORIDE 60 ML: 9 INJECTION, SOLUTION INTRAVENOUS at 14:51

## 2019-10-13 ASSESSMENT — MIFFLIN-ST. JEOR: SCORE: 904.02

## 2019-10-13 ASSESSMENT — ENCOUNTER SYMPTOMS
FREQUENCY: 0
HEMATURIA: 0
ABDOMINAL PAIN: 1
DYSURIA: 0
NAUSEA: 0
VOMITING: 0
BLOOD IN STOOL: 1
FEVER: 0

## 2019-10-13 NOTE — ED NOTES
"[facility]  ED Nurse Handoff Report    ED Chief complaint: Rectal Bleeding (woke up with rectal bleeding past two days)      ED Diagnosis:   Final diagnoses:   Non-specific colitis       Code Status: hospital MD at bedside     Allergies: No Known Allergies    Activity level - Baseline/Home:  Independent  Activity Level - Current:   Stand with Assist    Patient's Preferred language: English   Needed?: No    Isolation: No  Infection: Not Applicable  Bariatric?: No    Vital Signs:   Vitals:    10/13/19 1340 10/13/19 1544 10/13/19 1630 10/13/19 1700   BP: 112/57 124/64     Pulse: 50 59     Resp:  20     Temp:       TempSrc:       SpO2:  96% 96% 95%   Weight:       Height:           Cardiac Rhythm: ,        Pain level: 0-10 Pain Scale: 3    Is this patient confused?: No   Does this patient have a guardian?  No         If yes, is there guardianship documents in the Epic \"Code/ACP\" activity?  N/A         Guardian Notified?  N/A  [place] - Suicide Severity Rating Scale Completed?  Yes  If yes, what color did the patient score?  White    Patient Report: Initial Complaint: LLQ pain, rectal bleeding   Focused Assessment: Pt presents from home with complaints of LLQ pain and bloody stools for the past 2 days. WBC 17 and hgb stable at 11. Stool occult showing some red blood on exam. CT scan of abdomen showing colitis, possibly ischemic colitis. Lactic acid was ran and was negative. Pt a/o x4. VSS. Daughter at bedside  Tests Performed: Blood work, CT   Abnormal Results: Colitis, wbc 17  Treatments provided: No meds given in ED     Family Comments: Daughter at bedside    OBS brochure/video discussed/provided to patient/family: Yes              Name of person given brochure if not patient: n/a              Relationship to patient: n/a    ED Medications:   Medications   iopamidol (ISOVUE-370) solution 58 mL (58 mLs Intravenous Given 10/13/19 3818)   sodium chloride 0.9 % bag 500mL for CT scan flush use " (60 mLs Intravenous Given 10/13/19 3301)       Drips infusing?:  No    For the majority of the shift this patient was Green.   Interventions performed were meds, repo.    Severe Sepsis OR Septic Shock Diagnosis Present: No    To be done/followed up on inpatient unit:  obs    ED NURSE PHONE NUMBER: *[phone]

## 2019-10-13 NOTE — ED PROVIDER NOTES
History     Chief Complaint:  Rectal Bleeding     HPI   Verito Chicas is a 85 year old female with a history of Alzheimer's disease who presents with rectal bleeding and left sided abdominal pain. History is obtained via the patient's daughter given her memory impairments. The patient's daughter reports that yesterday the patient had two episodes of blood in her stool. This morning, there was blood in her underwear, which is atypical. This prompted her daughter to bring her to the ED for evaluation. Here, the patient notes some LLQ abdominal pain. Her daughter denies any fever, vomiting, or urinary symptoms. She denies a known history of hemorrhoids or ulcers for the patient. The patient's daughter notes that she has a history of intermittent constipation and/or diarrhea, but this has been investigated by her primary care provider. Ms. Chicas underwent a colonoscopy in 6/2019, and the family was told that this was unremarkable.     Allergies:  NKDA    Medications:    Amlodipine  Amoxicillin  Peridex   Cymbalta  Hydrochlorothiazide  Ibuprofen  Labetalol  Lisinopril  Multivitamin   Fish Oil  Omeprazole  Simvastatin     Past Medical History:    Alzheimer disease  Cancer  GERD  Hypertension     Past Surgical History:    Appendectomy   Right breast biopsy   Colonoscopy   Dental extraction   Cataract surgery   Left knee arthroscopy   Left shoulder surgery   Left wrist surgery     Family History:    The patient reports a fraternal history of colon cancer. The patient denies any other relevant family history.     Social History:  The patient is  and presents with her daughter. She reports light tobacco use of 0.25 packs per day. She denies alcohol and drug use.    Review of Systems   Constitutional: Negative for fever.   Gastrointestinal: Positive for abdominal pain and blood in stool. Negative for nausea and vomiting.   Genitourinary: Negative for dysuria, frequency, hematuria and urgency.   All other  "systems reviewed and are negative.    Physical Exam   First Vitals:  BP: 93/44  Pulse: 55  Temp: 97.4  F (36.3  C)  Resp: 18  Height: 154.9 cm (5' 1\")  Weight: 52.2 kg (115 lb)  SpO2: 98 %    Physical Exam  General: Well appearing, nontoxic. Resting comfortably  Head:  Scalp, face, and head appear normal  Eyes:  Pupils are equal, round, and reactive to light    Conjunctivae non-injected and sclerae white  ENT:    The external nose is normal    Pinnae are normal    The oropharynx is normal, mucous membranes moist    Uvula is in the midline  Neck:  Normal range of motion    There is no rigidity noted    Trachea is in the midline  CV:  Regular rate and rhythm     Normal S1/S2, no S3/S4    No murmur or rub  Resp:  Lungs are clear and equal bilaterally    There is no tachypnea    No increased work of breathing    No rales, wheezing, or rhonchi  GI:  Abdomen is soft, no rigidity or guarding    No distension, or mass    Mild LLQ tenderness. No rebound tenderness   Rectal Exam: No external anal lesions. Rectal tone normal. Scant traces of red mucous on examining finger. No shelby blood or melena. No stool in the rectal vault. No internal masses or lesions palpated.   MS:  Normal muscular tone    Symmetric motor strength    No lower extremity edema  Skin:  No rash or acute skin lesions noted  Neuro:  Awake and alert    Speech is normal and fluent    Moves all extremities spontaneously  Psych: Normal affect.  Appropriate interactions.      Emergency Department Course     Imaging:  Radiographic findings were communicated with the patient who voiced understanding of the findings.    CT Abdomen Pelvis w/ contras: IMPRESSION: 1.  Moderate wall thickening seen in the descending and sigmoid colon within the left hemiabdomen. Findings concerning for infectious versus inflammatory or ischemic colitis. 2.  2.5 cm right ovarian cyst. Annual follow up with pelvic ultrasound is recommended given size and age of the patient.  Report per " radiology.      Laboratory:  CBC: WBC 17.0 (high) o/w WNL. (HGB 11.8, )   CMP: Potassium 3.2 (low), Glucose 111. GFR 50 (low) o/w WNL (Creatinine 1.02)  INR: 1.04    Emergency Department Course:  Nursing notes and vitals reviewed. I performed an exam of the patient as documented above.   IV inserted and blood drawn. The patient was placed on continuous cardiac monitoring and pulse oximetry.   3:19 PM CT scan obtained. Results as above. Findings discussed with the patient.    5:30 PM Discussed the case with Dr. Cerna, on call for GI.   6:30 PM Discussed the case with Dr. Blum, on call for hospitalist services.   Findings and plan explained to the Patient and daughter who consents to admission. Discussed the patient with Dr. Blum, who will admit the patient for further monitoring, evaluation, and treatment.     Impression & Plan      Medical Decision Making:  Verito Chicas is a 85 year old female who presents for evaluation of rectal bleeding and left lower quadrant abdominal pain as noted above.  On my evaluation the patient is hemodynamically stable and well-appearing.  She has no evidence of peritonitis or acute surgical emergency on examination.  A broad differential diagnosis is considered including lower GI bleed versus upper GI bleed, diverticular bleed, AVM, diverticulitis or colitis, pyelonephritis, kidney stone,  Mesenteric ischemia, bowel obstruction or volvulus among others.  Work-up in the emergency department reveals a significant leukocytosis.  Hemoglobin and hematocrit are normal.  CT scan of the abdomen and pelvis reveals a nonspecific colitis of the descending and sigmoid colon.  Differential diagnosis would be inflammatory versus infectious versus ischemic colitis.  No other findings on CT scan to suggest any of the other above etiologies.  Given the patient's age significantly elevated white blood cell count and abdominal pain I discussed the case with Dr. Randall of  gastroenterology who recommended admission to observation with plan for colonoscopy tomorrow.  He recommended that we hold off on antibiotics unless the patient becomes febrile or toxic appearing.  The findings were discussed with the patient and the patient's daughter who are agreeable with the plan of care.  Case was discussed with the hospitalist and the patient will be admitted for further monitoring evaluation and treatment.  Patient was admitted in stable condition.      Diagnosis:    ICD-10-CM   1. Non-specific colitis K52.9       Disposition:  Admitted to Dr. Blum.         IWarren, am serving as a scribe at 1:55 PM on 10/13/2019 to document services personally performed by Ivan Alford MD, based on my observations and the provider's statements to me.         Ivan Alford MD  10/14/19 111

## 2019-10-14 VITALS
TEMPERATURE: 96.2 F | HEART RATE: 78 BPM | SYSTOLIC BLOOD PRESSURE: 155 MMHG | BODY MASS INDEX: 21.71 KG/M2 | HEIGHT: 61 IN | OXYGEN SATURATION: 92 % | DIASTOLIC BLOOD PRESSURE: 59 MMHG | RESPIRATION RATE: 18 BRPM | WEIGHT: 115 LBS

## 2019-10-14 LAB
ANION GAP SERPL CALCULATED.3IONS-SCNC: 2 MMOL/L (ref 3–14)
BUN SERPL-MCNC: 13 MG/DL (ref 7–30)
C COLI+JEJUNI+LARI FUSA STL QL NAA+PROBE: NOT DETECTED
CALCIUM SERPL-MCNC: 8 MG/DL (ref 8.5–10.1)
CHLORIDE SERPL-SCNC: 109 MMOL/L (ref 94–109)
CO2 SERPL-SCNC: 31 MMOL/L (ref 20–32)
COLONOSCOPY: NORMAL
CREAT SERPL-MCNC: 0.73 MG/DL (ref 0.52–1.04)
EC STX1 GENE STL QL NAA+PROBE: NOT DETECTED
EC STX2 GENE STL QL NAA+PROBE: NOT DETECTED
ENTERIC PATHOGEN COMMENT: NORMAL
ERYTHROCYTE [DISTWIDTH] IN BLOOD BY AUTOMATED COUNT: 14.9 % (ref 10–15)
GFR SERPL CREATININE-BSD FRML MDRD: 75 ML/MIN/{1.73_M2}
GLUCOSE SERPL-MCNC: 93 MG/DL (ref 70–99)
HCT VFR BLD AUTO: 33 % (ref 35–47)
HGB BLD-MCNC: 11.1 G/DL (ref 11.7–15.7)
MCH RBC QN AUTO: 29.7 PG (ref 26.5–33)
MCHC RBC AUTO-ENTMCNC: 33.6 G/DL (ref 31.5–36.5)
MCV RBC AUTO: 88 FL (ref 78–100)
NOROV GI+II ORF1-ORF2 JNC STL QL NAA+PR: NOT DETECTED
PLATELET # BLD AUTO: 242 10E9/L (ref 150–450)
POTASSIUM SERPL-SCNC: 2.9 MMOL/L (ref 3.4–5.3)
POTASSIUM SERPL-SCNC: 3.9 MMOL/L (ref 3.4–5.3)
RBC # BLD AUTO: 3.74 10E12/L (ref 3.8–5.2)
RVA NSP5 STL QL NAA+PROBE: NOT DETECTED
SALMONELLA SP RPOD STL QL NAA+PROBE: NOT DETECTED
SHIGELLA SP+EIEC IPAH STL QL NAA+PROBE: NOT DETECTED
SODIUM SERPL-SCNC: 142 MMOL/L (ref 133–144)
V CHOL+PARA RFBL+TRKH+TNAA STL QL NAA+PR: NOT DETECTED
WBC # BLD AUTO: 14.3 10E9/L (ref 4–11)
Y ENTERO RECN STL QL NAA+PROBE: NOT DETECTED

## 2019-10-14 PROCEDURE — 88305 TISSUE EXAM BY PATHOLOGIST: CPT | Performed by: INTERNAL MEDICINE

## 2019-10-14 PROCEDURE — 88305 TISSUE EXAM BY PATHOLOGIST: CPT | Mod: 26 | Performed by: INTERNAL MEDICINE

## 2019-10-14 PROCEDURE — 85027 COMPLETE CBC AUTOMATED: CPT | Performed by: PHYSICIAN ASSISTANT

## 2019-10-14 PROCEDURE — 25800030 ZZH RX IP 258 OP 636: Performed by: PHYSICIAN ASSISTANT

## 2019-10-14 PROCEDURE — G0378 HOSPITAL OBSERVATION PER HR: HCPCS

## 2019-10-14 PROCEDURE — 45380 COLONOSCOPY AND BIOPSY: CPT | Performed by: INTERNAL MEDICINE

## 2019-10-14 PROCEDURE — 25000128 H RX IP 250 OP 636: Performed by: INTERNAL MEDICINE

## 2019-10-14 PROCEDURE — 25000128 H RX IP 250 OP 636: Performed by: PHYSICIAN ASSISTANT

## 2019-10-14 PROCEDURE — G0500 MOD SEDAT ENDO SERVICE >5YRS: HCPCS | Performed by: INTERNAL MEDICINE

## 2019-10-14 PROCEDURE — 25000132 ZZH RX MED GY IP 250 OP 250 PS 637: Performed by: INTERNAL MEDICINE

## 2019-10-14 PROCEDURE — 87506 IADNA-DNA/RNA PROBE TQ 6-11: CPT | Performed by: PHYSICIAN ASSISTANT

## 2019-10-14 PROCEDURE — 96376 TX/PRO/DX INJ SAME DRUG ADON: CPT

## 2019-10-14 PROCEDURE — C9113 INJ PANTOPRAZOLE SODIUM, VIA: HCPCS | Performed by: PHYSICIAN ASSISTANT

## 2019-10-14 PROCEDURE — 36415 COLL VENOUS BLD VENIPUNCTURE: CPT | Performed by: PHYSICIAN ASSISTANT

## 2019-10-14 PROCEDURE — 99217 ZZC OBSERVATION CARE DISCHARGE: CPT | Performed by: INTERNAL MEDICINE

## 2019-10-14 PROCEDURE — 80048 BASIC METABOLIC PNL TOTAL CA: CPT | Performed by: PHYSICIAN ASSISTANT

## 2019-10-14 PROCEDURE — 36415 COLL VENOUS BLD VENIPUNCTURE: CPT | Performed by: INTERNAL MEDICINE

## 2019-10-14 PROCEDURE — 84132 ASSAY OF SERUM POTASSIUM: CPT | Performed by: INTERNAL MEDICINE

## 2019-10-14 RX ORDER — POTASSIUM CHLORIDE 1500 MG/1
20-40 TABLET, EXTENDED RELEASE ORAL
Status: DISCONTINUED | OUTPATIENT
Start: 2019-10-14 | End: 2019-10-14 | Stop reason: HOSPADM

## 2019-10-14 RX ORDER — AMLODIPINE BESYLATE 5 MG/1
5 TABLET ORAL DAILY
Status: DISCONTINUED | OUTPATIENT
Start: 2019-10-14 | End: 2019-10-14 | Stop reason: HOSPADM

## 2019-10-14 RX ORDER — HYDROCHLOROTHIAZIDE 25 MG/1
25 TABLET ORAL DAILY
Status: DISCONTINUED | OUTPATIENT
Start: 2019-10-14 | End: 2019-10-14 | Stop reason: HOSPADM

## 2019-10-14 RX ORDER — NALOXONE HYDROCHLORIDE 0.4 MG/ML
.1-.4 INJECTION, SOLUTION INTRAMUSCULAR; INTRAVENOUS; SUBCUTANEOUS
Status: DISCONTINUED | OUTPATIENT
Start: 2019-10-14 | End: 2019-10-14 | Stop reason: HOSPADM

## 2019-10-14 RX ORDER — POTASSIUM CHLORIDE 29.8 MG/ML
20 INJECTION INTRAVENOUS
Status: DISCONTINUED | OUTPATIENT
Start: 2019-10-14 | End: 2019-10-14 | Stop reason: HOSPADM

## 2019-10-14 RX ORDER — DONEPEZIL HYDROCHLORIDE 10 MG/1
5 TABLET, FILM COATED ORAL AT BEDTIME
Qty: 30 TABLET | Refills: 1 | Status: ON HOLD | OUTPATIENT
Start: 2019-10-14 | End: 2021-07-07

## 2019-10-14 RX ORDER — POTASSIUM CHLORIDE 7.45 MG/ML
10 INJECTION INTRAVENOUS
Status: DISCONTINUED | OUTPATIENT
Start: 2019-10-14 | End: 2019-10-14 | Stop reason: HOSPADM

## 2019-10-14 RX ORDER — LABETALOL 200 MG/1
400 TABLET, FILM COATED ORAL 2 TIMES DAILY
Status: DISCONTINUED | OUTPATIENT
Start: 2019-10-14 | End: 2019-10-14 | Stop reason: HOSPADM

## 2019-10-14 RX ORDER — FLUMAZENIL 0.1 MG/ML
0.2 INJECTION, SOLUTION INTRAVENOUS
Status: DISCONTINUED | OUTPATIENT
Start: 2019-10-14 | End: 2019-10-14 | Stop reason: HOSPADM

## 2019-10-14 RX ORDER — FENTANYL CITRATE 50 UG/ML
INJECTION, SOLUTION INTRAMUSCULAR; INTRAVENOUS PRN
Status: DISCONTINUED | OUTPATIENT
Start: 2019-10-14 | End: 2019-10-14 | Stop reason: HOSPADM

## 2019-10-14 RX ORDER — LISINOPRIL 40 MG/1
40 TABLET ORAL DAILY
Status: DISCONTINUED | OUTPATIENT
Start: 2019-10-14 | End: 2019-10-14 | Stop reason: HOSPADM

## 2019-10-14 RX ORDER — POTASSIUM CL/LIDO/0.9 % NACL 10MEQ/0.1L
10 INTRAVENOUS SOLUTION, PIGGYBACK (ML) INTRAVENOUS
Status: DISCONTINUED | OUTPATIENT
Start: 2019-10-14 | End: 2019-10-14 | Stop reason: HOSPADM

## 2019-10-14 RX ORDER — LIDOCAINE 40 MG/G
CREAM TOPICAL
Status: DISCONTINUED | OUTPATIENT
Start: 2019-10-14 | End: 2019-10-14 | Stop reason: HOSPADM

## 2019-10-14 RX ORDER — LIDOCAINE 40 MG/G
CREAM TOPICAL
Status: DISCONTINUED | OUTPATIENT
Start: 2019-10-14 | End: 2019-10-14

## 2019-10-14 RX ORDER — POTASSIUM CHLORIDE 1.5 G/1.58G
20-40 POWDER, FOR SOLUTION ORAL
Status: DISCONTINUED | OUTPATIENT
Start: 2019-10-14 | End: 2019-10-14 | Stop reason: HOSPADM

## 2019-10-14 RX ADMIN — POTASSIUM CHLORIDE 40 MEQ: 1500 TABLET, EXTENDED RELEASE ORAL at 08:32

## 2019-10-14 RX ADMIN — SODIUM CHLORIDE: 9 INJECTION, SOLUTION INTRAVENOUS at 07:59

## 2019-10-14 RX ADMIN — POTASSIUM CHLORIDE 40 MEQ: 1500 TABLET, EXTENDED RELEASE ORAL at 10:25

## 2019-10-14 RX ADMIN — PANTOPRAZOLE SODIUM 40 MG: 40 INJECTION, POWDER, FOR SOLUTION INTRAVENOUS at 07:57

## 2019-10-14 NOTE — PLAN OF CARE
Pt is A+O x 3, forgetful. [place]. Up w/ SBA, amb to BR. VSS on RA. BS+,hyperactive. LLQ tender to touch 5/10, declined pain interventions. Red streaks noted in BMs, no further bleeding at this time. Colonoscopy prep completed; having multiple BMs, frustrated. Active/therapeutic listening, encouragement, reassurance provided. NPO at 0230. Started IV Protonix. GI is consulting. Potassium 3.2, paged [facility] Saravia for replacement protocol, Potassium packet ordered x 1. Hgb 11.8, 10.8. Voiding. IV SL is infusing. GI consulted, plan for colonoscopy today. Stool enteric virus panel result pending. Remains on enteric isolation. Daughter at bedside, supportive.

## 2019-10-14 NOTE — CONSULTS
Lower GI bleed with left sided colitis on CT.  Plan for colonoscopy in AM  Full consult in AM.  Clear liquid tonight.  NPO after midnight.    Carson Randall MD FACP  Prakash GOTTLIEB

## 2019-10-14 NOTE — PLAN OF CARE
PRIMARY DIAGNOSIS: GI BLEED    OUTPATIENT/OBSERVATION GOALS TO BE MET BEFORE DISCHARGE  Orthostatic performed: NO    Stable Hgb: YES   Recent Labs   Lab Test 10/13/19  1400 02/03/19  0900 08/22/15  0648   HGB 11.8 12.0 10.3*         Resolved or declined bleeding episodes: No Last episode: 10/13/19    Appropriate testing complete: Yes     Cleared for discharge by consultants (if involved): No     Safe discharge environment identified: YES     Discharge Planner Nurse   Safe discharge environment identified: YES   Barriers to discharge: YES        Entered by: Aniceto Gabriel 10/13/2019      Please review provider order for any additional goals.   Nurse to notify provider when observation goals have been met and patient is ready for discharge.

## 2019-10-14 NOTE — PLAN OF CARE
Observation Goals:     -GI consult: Met  -Colonoscopy complete: Not met, scheduled for 1400  -Hgb stable: Met, Hgb 11.1    Nurse to notify provider when observation goals have been met and patient is ready for discharge.

## 2019-10-14 NOTE — H&P
Admitted:     10/13/2019      CHIEF COMPLAINT:  GI bleeding.      HISTORY OBTAINED:  History obtained from the patient, though limited due to her dementia, and also from her daughter who is present at bedside and a very good historian.      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  The patient reports that, on the day prior to admission, she noticed blood in her underwear.  Later on in the day, she had 2 largely formed bowel movements with bright red blood mixed into the stool and blood on the outside of the stool.  She otherwise reports feeling fine.  This morning, she again woke up with blood in her underwear, mentioned this to family who decided to bring her to the Emergency Department for evaluation.  She noted some left lower quadrant tenderness with palpation; otherwise, denies resting pain.  She was seen in the Emergency Department, by Dr. Alford.  Laboratory evaluation was notable for a mild creatinine elevation of 1.02, a white blood cell count elevation of 17, with hemoglobin of 11.8.  A CT of her abdomen and pelvis was obtained showing moderate wall thickening seen in the descending and sigmoid colon within the left mima-abdomen concerning for infectious versus inflammatory versus ischemic colitis.  The patient denies any fevers or chills and overall does not feel ill.  She has not traveled recently or eaten any suspicious food.  Of note, however, her daughter-in-law is also admitted at [facility] with GI bleeding.  She denies any nausea or vomiting and reports she has been able to tolerate p.o. intake without difficulty.  She has no history of GI bleeding, as far she is  aware.  She does follow with  Colorectal Surgery, as she has had a dysplastic polyp removed in the past.  She has had 2 recent colonoscopies this year, the first in 2019, due to some diarrhea with diverticulosis noted, as well as 1 polyp.  A colonoscopy was repeated in 2019, and 4 sessile polyps were removed at that time.  She denies any major changes in bowel habits recently, prior to yesterday.  She has not had any urinary symptoms, vaginal bleeding.  She is presently evaluated in the Observation Unit by me.  She reports that she has some increased abdominal discomfort, which she currently rates 5/10, but notes that this is only present when she palpates her left lower quadrant, and is not present at rest.  Her last stool was just after arriving to the Observation Unit, which is her second total of the day.  Unfortunately, this is not observed by nursing staff.  She denies chest pain, shortness of breath, dizziness, nausea, vomiting.  She is at her baseline mentation, per her daughter, who reports she is a poor short-term memory and also has some trouble with orientation to date, time, etc.      REVIEW OF SYSTEMS:  A 10-point review of systems was conducted and negative, aside from the information in the HPI.      PAST MEDICAL HISTORY:   1.  Dementia.  The patient resides with  and 3 children look after her.  On admission, she is disoriented to month or year, but is generally appropriate and conversational and can provide some history.   2.  Hypertension.   3.  GERD.   4.  Hyperlipidemia.   5.  History of breast cancer, status post lumpectomy.   6.  History of adenocarcinoma, status post resection of the colon.      PAST SURGICAL HISTORY:   section.      ALLERGIES:  NO KNOWN DRUG ALLERGIES.      PRIOR TO ADMISSION MEDICATIONS:     Prior to Admission medications    Medication Sig Last Dose Taking? Auth Provider   amLODIPine (NORVASC) 5 MG tablet Take 5 mg by mouth daily  10/13/2019 at am Yes  Unknown, Entered By History   Calcium Carbonate-Vit D-Min (CALCIUM 1200) 6842-2954 MG-UNIT CHEW Take 2 tablets by mouth daily 10/13/2019 at am Yes Unknown, Entered By History   cetirizine (ZYRTEC) 10 MG tablet Take 10 mg by mouth every evening 10/12/2019 at pm Yes Unknown, Entered By History   chlorhexidine (PERIDEX) 0.12 % solution Swish and spit 15 mLs in mouth 2 times daily Swish x 1 minute and then spit every morning and night x 1 week. 10/13/2019 at am Yes Unknown, Entered By History   DULoxetine (CYMBALTA) 30 MG capsule Take 30 mg by mouth daily  10/13/2019 at am Yes Unknown, Entered By History   fish oil-omega-3 fatty acids (OMEGA-3 FISH OIL) 1000 MG capsule Take 2 g by mouth daily Strength unknown  10/13/2019 at am Yes Unknown, Entered By History   hydrochlorothiazide (HYDRODIURIL) 25 MG tablet Take 25 mg by mouth daily  10/13/2019 at am Yes Unknown, Entered By History   labetalol (NORMODYNE) 200 MG tablet Take 400 mg by mouth 2 times daily 2 x 200 mg  10/13/2019 at am Yes Unknown, Entered By History   lisinopril (PRINIVIL/ZESTRIL) 40 MG tablet Take 40 mg by mouth daily  10/13/2019 at am Yes Unknown, Entered By History   omeprazole (PRILOSEC) 40 MG DR capsule Take 40 mg by mouth daily  10/13/2019 at am Yes Unknown, Entered By History   donepezil (ARICEPT) 10 MG tablet Take by mouth At Bedtime Simvastatin pill bottle filled with 1/2 tablets of donepezil 10 mg.     Unknown, Entered By History   simvastatin (ZOCOR) 40 MG tablet Take 20 mg by mouth At Bedtime      Unknown, Entered By History           SOCIAL HISTORY:  The patient denies any alcohol or drug use.  She is a current every day smoker and has smoked since college, 60+ years, currently smoking 5 cigarettes per day.  Again,  she lives with her .      FAMILY HISTORY:  She has a brother who had a history of colon cancer.      LABORATORY EVALUATION:  CMP is notable for A creatinine of 1.02, potassium of 3.2.  Lactic acid is normal at 0.6.  White  blood cell count of 17, hemoglobin 11.8, hematocrit 35.3 and platelet count is 259.  Her VBG is unremarkable.  She has been typed and screened.  CT of the abdomen and pelvis shows moderate wall thickening in the descending and sigmoid colon, concerning for infectious versus inflammatory versus ischemic colitis.      PHYSICAL EXAMINATION:   VITAL SIGNS:  Temperature 96.5, heart rate 54, blood pressure 116/51, respiratory rate 18, oxygen saturation is 95% on room air.   GENERAL:  The patient is awake, alert, pleasantly conversant.  She is oriented to family, recalls details of her situation, cannot tell me the month or the year.   HEENT:  Pupils are equal and reactive to light, EOMI.   ENT:  Mucous membranes are moist.   CARDIOVASCULAR:  Regular rate and rhythm, no murmurs appreciated.   RESPIRATORY:  Lungs are clear to auscultation bilaterally, no increased work of breathing or wheezing.   GASTROINTESTINAL:  Positive bowel sounds.  Abdomen is soft, nondistended.  She is tender with a light palpation in the left lower quadrant without rebound or guarding.   MUSCULOSKELETAL:  The patient moves all 4 extremities, normal tone.   NEUROLOGIC:  Cranial nerves II-XII are grossly intact.  No focal deficits.  Speech is clear.   SKIN:  Warm and dry.      ASSESSMENT AND PLAN:  Verito Chicas is an 85-year-old female with a history of dementia, hypertension, hyperlipidemia, GERD, breast cancer, and colon cancer status post resection, who presents to the Emergency Department today for evaluation of rectal bleeding.  She is being admitted under observation status for further management of colitis.   1.  Colitis, infectious versus ischemic.  The patient presents with 2 days of rectal bleeding, 2 stools per day which she describes as bright red blood mixed into her stool.  She denies a known history of hemorrhoids.  Hemoglobin is stable on admission, at 11.8, which is consistent with her recent baseline.  She denies dizziness.   She does have a leukocytosis of 17 with a left shift and CT findings show wall thickening and inflammation in the descending and sigmoid colon.  Lactate is normal at 0.6.  The patient does not have a significant resting pain at all, but notes 5/10 pain when she palpates her left lower quadrant.  Abdomen is not acute on exam.  Case was discussed with Dr. Randall, by the ED provider, who recommended prepping for colonoscopy tomorrow.  No antibiotics indicated at this time, unless patient develops fever.  She does have some risk factors for ischemic colitis, including being a smoker.     -GI consulted, appreciate assistance.   -NPO.   -Will check another hemoglobin this evening and again in the morning.   -The patient has been typed and screened, conditional unit ordered for hemoglobin less than 7.   -We will check stool cultures to evaluate for infectious etiology, particularly in light of family members, concurrent bloody stools.   -Doubt upper source, but will give daily PPI.   -Repeat CBC in the a.m.   -Hydrate with normal saline.   -If any fevers overnight, on-call provider to be notified for initiation of antibiotics.  Also, low threshold to notify with worsening pain.   2.  Hypertension.  Awaiting formal medication reconciliation at this time.  The patient is on several blood pressure medications prior to admission.  BP in the ED has actually been between 93 and 130 systolic.  Will allow for adequate perfusion, at this time, and hold antihypertensives this evening with p.r.n. hydralazine available if needed.  Can re-evaluate medications in the a.m.   3.  Acute kidney injury.  The patient's baseline creatinine appears to be around 0.5 to 0.8.  It is mildly elevated today at 1.02.  We will hold lisinopril and hydrochlorothiazide, and recheck BMP in the morning.   4.  Dyslipidemia.  We will hold statin, in light of Observation status.   5.  Dementia.  The patient is oriented to self, general situation, family,  season, and is appropriately conversant.  She cannot tell me the month or year when asked.  She is at baseline, per family report.   6.  CODE STATUS:  The patient is DNI.  This was discussed with the patient and her daughter who is present at bedside.   7.  DVT prophylaxis:  PCDs.        The patient was seen and examined with Dr. Fransisco Reyes, who agrees with the above plan.         FRANSISCO REYES, DO       As dictated by BLOSSOM CASTELLANO PA-C            D: 10/13/2019   T: 10/13/2019   MT: LORA      Name:     ARANZA DOBSON   MRN:      [id number]        Account:      [id number]   :      1933        Admitted:     10/13/2019                   Document: [id number]

## 2019-10-14 NOTE — PHARMACY-ADMISSION MEDICATION HISTORY
"Admission medication history interview status for the 10/13/2019  admission is complete. See EPIC admission navigator for prior to admission medications     Medication history source reliability:Moderate    Actions taken by pharmacist (provider contacted, etc): Note from evening pharmacist last night stated \"I reviewed all pill bottles with daughter and patient (has alzheimers). In bag, there were 2 bottles labeled simvastatin 40 mg 1/2 tablet at bedtime. Both bottles had 1/2 tablets in them as patient's  cuts her half pills. One simvastatin bottle had simvastatin cut in half, but in the other simvastatin bottle, there were donepezil 10 mg tablets cut in half (likely ~30 tablets cut in half). Unsure how this happened, maybe  accidentally put donepezil pills in this bottle and cut them in half bc he thought they were simvastatin. No donepezil bottle present.\"  I called [facility] today, waited on hold over an hour and never able to get ahold of anybody. Contacted [facility] in ER who was able to access a program called [facility] (which [facility] does not have) and confirmed pt filled donepezil on 10/9/19 x 90 day supply.      Additional medication history information not noted on PTA med list :None    Medication reconciliation/reorder completed by provider prior to medication history? No    Time spent in this activity: > 1 day    Prior to Admission medications    Medication Sig Last Dose Taking? Auth Provider   amLODIPine (NORVASC) 5 MG tablet Take 5 mg by mouth daily  10/13/2019 at am Yes Unknown, Entered By History   Calcium Carbonate-Vit D-Min (CALCIUM 1200) 8952-7979 MG-UNIT CHEW Take 2 tablets by mouth daily 10/13/2019 at am Yes Unknown, Entered By History   cetirizine (ZYRTEC) 10 MG tablet Take 10 mg by mouth every evening 10/12/2019 at pm Yes Unknown, Entered By History   chlorhexidine (PERIDEX) 0.12 % solution Swish and spit 15 mLs in mouth 2 times daily Swish x 1 minute and then spit every morning and night x " 1 week. 10/13/2019 at am Yes Unknown, Entered By History   DULoxetine (CYMBALTA) 30 MG capsule Take 30 mg by mouth daily  10/13/2019 at am Yes Unknown, Entered By History   fish oil-omega-3 fatty acids (OMEGA-3 FISH OIL) 1000 MG capsule Take 2 g by mouth daily Strength unknown  10/13/2019 at am Yes Unknown, Entered By History   hydrochlorothiazide (HYDRODIURIL) 25 MG tablet Take 25 mg by mouth daily  10/13/2019 at am Yes Unknown, Entered By History   labetalol (NORMODYNE) 200 MG tablet Take 400 mg by mouth 2 times daily 2 x 200 mg  10/13/2019 at am Yes Unknown, Entered By History   lisinopril (PRINIVIL/ZESTRIL) 40 MG tablet Take 40 mg by mouth daily  10/13/2019 at am Yes Unknown, Entered By History   omeprazole (PRILOSEC) 40 MG DR capsule Take 40 mg by mouth daily  10/13/2019 at am Yes Unknown, Entered By History   donepezil (ARICEPT) 10 MG tablet Take by mouth At Bedtime Simvastatin pill bottle filled with 1/2 tablets of donepezil 10 mg.   Unknown, Entered By History   simvastatin (ZOCOR) 40 MG tablet Take 20 mg by mouth At Bedtime    Unknown, Entered By History     Monica Munoz, PharmD

## 2019-10-14 NOTE — PLAN OF CARE
PRIMARY DIAGNOSIS: GI BLEED    OUTPATIENT/OBSERVATION GOALS TO BE MET BEFORE DISCHARGE  1. Orthostatic performed: NO    2. Stable Hgb: YES   Recent Labs   Lab Test 10/13/19  1400 02/03/19  0900 08/22/15  0648   HGB 11.8 12.0 10.8*         3. Resolved or declined bleeding episodes: No Last episode: 10/13/19    4. Appropriate testing complete: Yes   5.   6. Cleared for discharge by consultants (if involved): No     7. Safe discharge environment identified: YES     Discharge Planner Nurse   Safe discharge environment identified: YES   Barriers to discharge: YES        Entered by: Aniceto Gabriel 10/14/2019      Please review provider order for any additional goals.   Nurse to notify provider when observation goals have been met and patient is ready for discharge.

## 2019-10-14 NOTE — PROGRESS NOTES
RECEIVING UNIT ED HANDOFF REVIEW    ED Nurse Handoff Report was reviewed by: Aniceto Gabriel RN on October 13, 2019 at 7:32 PM

## 2019-10-14 NOTE — DISCHARGE SUMMARY
[facility]  Discharge Summary  Hospitalist    Date of Admission:  10/13/2019  Date of Discharge:  10/14/2019 to home  Discharging Provider: Catie Abreu MD    Discharge Diagnoses   Ischemic colitis, acute  GI bleed secondary to above  Hypertension  Dementia  GERD    History of Present Illness   Verito Chicas is an 85 year old female who presented with blood in stool. Please see admission H&P for complete details.     Hospital Course   The following problems were addressed during her hospitalization:    GI bleed  GERD  GI consultation obtained to evaluate for GI bleed. Prep completed. Colonoscopy performed with the following findings: three 5 mm polyps in cecum. Continuous area of non-bleeding ulcerated mucosa with stigmata of recent bleeding present in sigmoid colon and descending colon for which biopsies were taken. Likely from ischemia. Recommended to resume regular diet, avoid constipation and return to GI clinic in ~ 2 weeks to review biopsy results. Will need repeat colonoscopy in ~ 6 months for surveillance of multiple polyps. Hemoglobin stable in range of ~11 mg/dl. No change in PPI dosing.    Hypertension  Patient noted to be hypertensive in the setting of holding her usual blood pressure medicines for NPO status and colonoscopy prep. These were all resumed prior to discharge: amlodipine 5 mg daily, hydrochlorothiazide 25 mg daily, labetalol 400 mg BID, lisinopril 40 mg daily.     Dementia  Clarified that donepazil dosing should be 5 mg at bedtime.     All other medications resumed.    Catie Abreu MD  ~~~~~~~~~~~~~~~~~~~~~~~~~~~~~~~~~~~~~~~~~~~~~~~~~~~~~~~~~~~    Pending Results   These results will be followed up by Hospitalist.  Unresulted Labs Ordered in the Past 30 Days of this Admission     No orders found for last 31 day(s).          Code Status   DNI       Primary Care Physician   Skylar Douglas    Physical Exam   Temp: 96.2  F (35.7  C) Temp src: Oral BP: (!)  155/59 Pulse: 78 Heart Rate: 75 Resp: 18 SpO2: 92 % O2 Device: None (Room air)    Vitals:    10/13/19 1119   Weight: 52.2 kg (115 lb)     Vital Signs with Ranges  Temp:  [96.2  F (35.7  C)-98.2  F (36.8  C)] 96.2  F (35.7  C)  Pulse:  [54-96] 78  Heart Rate:  [65-95] 75  Resp:  [13-30] 18  BP: (116-210)/() 155/59  SpO2:  [92 %-97 %] 92 %  I/O last 3 completed shifts:  In: 4000 [P.O.:4000]  Out: -     Constitutional: Alert, NAD, pleasant and cooperative      Discharge Disposition   Discharged to home  Condition at discharge: Stable    Consultations This Hospital Stay   GASTROENTEROLOGY IP CONSULT    Time Spent on this Encounter   I, Catie Abreu MD, personally saw the patient today and spent 35 minutes discharging this patient.    Discharge Orders      Reason for your hospital stay    GI bleed     Follow-up and recommended labs and tests     Follow up with primary care provider, Skylar Douglas, within 7 days for hospital follow- up.  The following labs/tests are recommended: check hemoglobin.    Follow up with Dr. Randall or Dr. Leigh , at (location with clinic name or city) GI clinic, within 1-2 weeks  for hospital follow- up and to follow up on results. No follow up labs or test are needed.     Activity    Your activity upon discharge: activity as tolerated     DNI     Diet    Follow this diet upon discharge: Regular diet     Discharge Medications   Current Discharge Medication List      CONTINUE these medications which have CHANGED    Details   donepezil (ARICEPT) 10 MG tablet Take 0.5 tablets (5 mg) by mouth At Bedtime  Qty: 30 tablet, Refills: 1    Associated Diagnoses: Senile dementia without behavioral disturbance (H)         CONTINUE these medications which have NOT CHANGED    Details   amLODIPine (NORVASC) 5 MG tablet Take 5 mg by mouth daily       Calcium Carbonate-Vit D-Min (CALCIUM 1200) 2170-1370 MG-UNIT CHEW Take 2 tablets by mouth daily      cetirizine (ZYRTEC) 10 MG tablet Take 10 mg by  mouth every evening      chlorhexidine (PERIDEX) 0.12 % solution Swish and spit 15 mLs in mouth 2 times daily Swish x 1 minute and then spit every morning and night x 1 week.      DULoxetine (CYMBALTA) 30 MG capsule Take 30 mg by mouth daily       fish oil-omega-3 fatty acids (OMEGA-3 FISH OIL) 1000 MG capsule Take 2 g by mouth daily Strength unknown       hydrochlorothiazide (HYDRODIURIL) 25 MG tablet Take 25 mg by mouth daily       labetalol (NORMODYNE) 200 MG tablet Take 400 mg by mouth 2 times daily 2 x 200 mg       lisinopril (PRINIVIL/ZESTRIL) 40 MG tablet Take 40 mg by mouth daily       omeprazole (PRILOSEC) 40 MG DR capsule Take 40 mg by mouth daily       simvastatin (ZOCOR) 40 MG tablet Take 20 mg by mouth At Bedtime            Allergies   No Known Allergies  Data

## 2019-10-14 NOTE — PLAN OF CARE
A&Ox4, but forgetful. BP hypertensive, 177/62. MD notified. OVSS. Pt. denies pain and nausea. NPO with meds. K+ replaced for 2.9, recheck 3.9. Pt. went down for a colonoscopy around 1330. Voiding spontaneously. Small BM's but no blood in stool. Up with SBA. No acute events. Orders for discharge written. Home medications and return to clinic schedule reviewed with patient and son. Discharge instructions and parameters for calling health care provider reviewed. Patient and family verbalized understanding and stating feeling ready for discharge. Patient left with son at 1740.

## 2019-10-14 NOTE — CONSULTS
[facility]  Gastroenterology Consultation         Verito Chicas  [address] [place] [address]  85 year old female    Admission Date/Time: 10/13/2019  Primary Care Provider: Skylar Douglas  Referring / Attending Physician:  Madison Maurer PA-C    We were asked to see the patient in consultation by Madison Maurer PA-C for evaluation of GI bleed.      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 Patient and daughter who has given some history, report bright red blood in stool 2 days ago on 2 occassions with large formed bowel movement. She noted blood in underwear yesterday and reported to family whom brought her to the ED.  She states she has noted pain in LLQ over last week. She has had constipation, in last 6 months had concerns with diarrhea that has resolved.  She denies nausea, vomiting, chest pain, fever, chills, heartburn, dysphagia, lightheadedness or shortness of breath. She has no history of PUD. Her last colonoscopy was done with Dr. Colt Smith, on 06/17/2019 who noted multiple non dysplastic tubular adenomas and recommended repeat colonoscopy in 3 years. She had a prior colonoscopy on 3/11/2019 that noted diverticulosis and high grade dysplastic tubular adenoma in hepatic flexure that also had minute focus of invasive adenocarcinoma with margins difficult to determine, hence repeat colonoscopy in 06/2019.    She is afebrile. Laboratory evaluation was notable for a mild creatinine elevation of 1.02, a white blood cell count elevation of 17, with hemoglobin of 11.8 and repeat 10.8 and 11.1. Baseline hemoglobin is 12.0    A CT of her abdomen  and pelvis was obtained showing moderate wall thickening seen in the descending and sigmoid colon within the left mima-abdomen concerning for infectious versus inflammatory versus ischemic colitis.     ROS: A comprehensive ten point review of systems was negative aside from those in mentioned in the HPI.      PAST MED HX:  I have reviewed this patient's medical history and updated it with pertinent information if needed.   Past Medical History:   Diagnosis Date     Alzheimer disease (H)      Cancer (H)     breast cancer     GERD (gastroesophageal reflux disease)      Hypertension        MEDICATIONS:   Prior to Admission Medications   Prescriptions Last Dose Informant Patient Reported? Taking?   Calcium Carbonate-Vit D-Min (CALCIUM 1200) 6520-3743 MG-UNIT CHEW 10/13/2019 at am Daughter Yes Yes   Sig: Take 2 tablets by mouth daily   DULoxetine (CYMBALTA) 30 MG capsule 10/13/2019 at am Daughter Yes Yes   Sig: Take 30 mg by mouth daily    amLODIPine (NORVASC) 5 MG tablet 10/13/2019 at am Daughter Yes Yes   Sig: Take 5 mg by mouth daily    cetirizine (ZYRTEC) 10 MG tablet 10/12/2019 at pm Daughter Yes Yes   Sig: Take 10 mg by mouth every evening   chlorhexidine (PERIDEX) 0.12 % solution 10/13/2019 at am Daughter Yes Yes   Sig: Swish and spit 15 mLs in mouth 2 times daily Swish x 1 minute and then spit every morning and night x 1 week.   donepezil (ARICEPT) 10 MG tablet  Daughter Yes No   Sig: Take by mouth At Bedtime Simvastatin pill bottle filled with 1/2 tablets of donepezil 10 mg.   fish oil-omega-3 fatty acids (OMEGA-3 FISH OIL) 1000 MG capsule 10/13/2019 at am Daughter Yes Yes   Sig: Take 2 g by mouth daily Strength unknown    hydrochlorothiazide (HYDRODIURIL) 25 MG tablet 10/13/2019 at am Daughter Yes Yes   Sig: Take 25 mg by mouth daily    labetalol (NORMODYNE) 200 MG tablet 10/13/2019 at am Daughter Yes Yes   Sig: Take 400 mg by mouth 2 times daily 2 x 200 mg    lisinopril (PRINIVIL/ZESTRIL) 40 MG tablet  10/13/2019 at am Daughter Yes Yes   Sig: Take 40 mg by mouth daily    omeprazole (PRILOSEC) 40 MG DR capsule 10/13/2019 at am Daughter Yes Yes   Sig: Take 40 mg by mouth daily    simvastatin (ZOCOR) 40 MG tablet  Daughter Yes No   Sig: Take 20 mg by mouth At Bedtime       Facility-Administered Medications: None       ALLERGIES: No Known Allergies    SOCIAL HISTORY:  Social History     Tobacco Use     Smoking status: Light Tobacco Smoker     Packs/day: 0.25     Types: Cigarettes     Smokeless tobacco: Never Used   Substance Use Topics     Alcohol use: No     Drug use: No       FAMILY HISTORY:  Family History   Problem Relation Age of Onset     Colon Cancer Brother        PHYSICAL EXAM:   General  Alert, oriented and comfortable  Vital Signs with Ranges  Temp: 96.4  F (35.8  C) Temp src: Oral BP: (!) 155/57 Pulse: 60   Resp: 16 SpO2: 92 % O2 Device: None (Room air)    I/O last 3 completed shifts:  In: 4000 [P.O.:4000]  Out: -     Constitutional: healthy, alert, no distress, cooperative and smiling   Cardiovascular: negative, PMI normal. No lifts, heaves, or thrills. RRR. No murmurs, clicks gallops or rub  Respiratory: negative, Percussion normal. Good diaphragmatic excursion. Lungs clear  Head: Normocephalic. No masses, lesions, tenderness or abnormalities  Neck: Neck supple. No adenopathy. Thyroid symmetric, normal size,, Carotids without bruits.  Abdomen: Abdomen soft, tender. BS normal. No masses, organomegaly, positive findings: tenderness moderate LLQ          ADDITIONAL COMMENTS:   I reviewed the patient's new clinical lab test results.   Recent Labs   Lab Test 10/14/19  0624 10/13/19  2223 10/13/19  1400 02/03/19  0900   WBC 14.3*  --  17.0* 8.3   HGB 11.1* 10.8* 11.8 12.0   MCV 88  --  88 89     --  259 237   INR  --   --  1.04  --      Recent Labs   Lab Test 10/14/19  0624 10/13/19  1400 02/03/19  0900   POTASSIUM 2.9* 3.2* 3.1*   CHLORIDE 109 105 107   CO2 31 26 25   BUN 13 18 14   ANIONGAP 2* 6 8  "    Recent Labs   Lab Test 10/13/19  1400 02/03/19  1000 02/03/19  0900   ALBUMIN 3.4  --  3.5   BILITOTAL 0.6  --  0.4   ALT 19  --  20   AST 18  --  11   PROTEIN  --  Negative  --        I reviewed the patient's new imaging results.        CONSULTATION ASSESSMENT AND PLAN:    Active Problems:    Verito \"Mercedes\" Juan Francisco is a pleasant 85 year old female with history as listed above with 2 day complaint of bright red bleeding per rectum with LLQ pain and findings on CT abdomen and pelvis showing moderate wall thickening seen in the descending and sigmoid colon within the left mima-abdomen concerning for infectious versus inflammatory versus ischemic colitis.  Her hemoglobin has remained stable around 11.  She has a history of high grade dysplastic/adenocarcinoma tubular adenoma at the hepatic flexure in 03/2019 and repeat colonoscopy in 06/2019 noted diverticulosis and non dysplastic tubular adenomas.       GI bleed  Bright red bleeding per rectum    Patients findings on CT are concerning for ischemic colitis vs diverticulitis. Other unlikely causes would include hemorrhoid, solitary rectal ulcer or less likely neoplastic cause. Patient has prepped for a colonoscopy and did not noted bright red blood in stool.  - Check hemoglobin daily until discharge  - Keep NPO until post colonoscopy  - Continue with pantoprazole  - Plan to see patient in next week as an outpatient at [facility] to discuss results and follow up          [facility]  Office: [phone]  Cell : [phone] (Dr. Randall)  Cell: [phone] (Lori Vazquez PA-C)      "

## 2019-10-14 NOTE — PROGRESS NOTES
MD Notification    Notified Person: MD    Notified Person Name: Dr Randall    Notification Date/Time: 10/13/19 at 2100    Notification Interaction: Phone     Purpose of Notification: Need colonoscopy prep    Orders Received: See note and orders

## 2019-10-16 LAB — COPATH REPORT: NORMAL

## 2020-04-07 ENCOUNTER — HOSPITAL ENCOUNTER (EMERGENCY)
Facility: CLINIC | Age: 85
Discharge: HOME | End: 2020-04-07
Attending: EMERGENCY MEDICINE | Admitting: EMERGENCY MEDICINE
Payer: COMMERCIAL

## 2020-04-07 ENCOUNTER — APPOINTMENT (OUTPATIENT)
Dept: GENERAL RADIOLOGY | Facility: CLINIC | Age: 85
End: 2020-04-07
Attending: EMERGENCY MEDICINE
Payer: COMMERCIAL

## 2020-04-07 ENCOUNTER — APPOINTMENT (OUTPATIENT)
Dept: CT IMAGING | Facility: CLINIC | Age: 85
End: 2020-04-07
Attending: EMERGENCY MEDICINE
Payer: COMMERCIAL

## 2020-04-07 VITALS
RESPIRATION RATE: 25 BRPM | OXYGEN SATURATION: 93 % | HEART RATE: 71 BPM | SYSTOLIC BLOOD PRESSURE: 176 MMHG | TEMPERATURE: 99.3 F | DIASTOLIC BLOOD PRESSURE: 74 MMHG

## 2020-04-07 DIAGNOSIS — A04.72 C. DIFFICILE COLITIS: ICD-10-CM

## 2020-04-07 DIAGNOSIS — R05.9 COUGH: ICD-10-CM

## 2020-04-07 LAB
ALBUMIN UR-MCNC: 10 MG/DL
ANION GAP SERPL CALCULATED.3IONS-SCNC: 7 MMOL/L (ref 3–14)
APPEARANCE UR: CLEAR
BASOPHILS # BLD AUTO: 0 10E9/L (ref 0–0.2)
BASOPHILS NFR BLD AUTO: 0.1 %
BILIRUB UR QL STRIP: NEGATIVE
BUN SERPL-MCNC: 13 MG/DL (ref 7–30)
BURR CELLS BLD QL SMEAR: SLIGHT
C DIFF TOX B STL QL: POSITIVE
CALCIUM SERPL-MCNC: 8.8 MG/DL (ref 8.5–10.1)
CHLORIDE SERPL-SCNC: 101 MMOL/L (ref 94–109)
CO2 SERPL-SCNC: 24 MMOL/L (ref 20–32)
COLOR UR AUTO: YELLOW
CREAT SERPL-MCNC: 0.93 MG/DL (ref 0.52–1.04)
DACRYOCYTES BLD QL SMEAR: SLIGHT
DIFFERENTIAL METHOD BLD: ABNORMAL
EOSINOPHIL # BLD AUTO: 0.1 10E9/L (ref 0–0.7)
EOSINOPHIL NFR BLD AUTO: 0.3 %
ERYTHROCYTE [DISTWIDTH] IN BLOOD BY AUTOMATED COUNT: 14.4 % (ref 10–15)
GFR SERPL CREATININE-BSD FRML MDRD: 56 ML/MIN/{1.73_M2}
GLUCOSE SERPL-MCNC: 131 MG/DL (ref 70–99)
GLUCOSE UR STRIP-MCNC: NEGATIVE MG/DL
HCT VFR BLD AUTO: 35.9 % (ref 35–47)
HGB BLD-MCNC: 12 G/DL (ref 11.7–15.7)
HGB UR QL STRIP: NEGATIVE
IMM GRANULOCYTES # BLD: 0.1 10E9/L (ref 0–0.4)
IMM GRANULOCYTES NFR BLD: 0.5 %
KETONES UR STRIP-MCNC: NEGATIVE MG/DL
LACTATE BLD-SCNC: 1.1 MMOL/L (ref 0.7–2)
LEUKOCYTE ESTERASE UR QL STRIP: NEGATIVE
LYMPHOCYTES # BLD AUTO: 3 10E9/L (ref 0.8–5.3)
LYMPHOCYTES NFR BLD AUTO: 13.9 %
MCH RBC QN AUTO: 29.7 PG (ref 26.5–33)
MCHC RBC AUTO-ENTMCNC: 33.4 G/DL (ref 31.5–36.5)
MCV RBC AUTO: 89 FL (ref 78–100)
MONOCYTES # BLD AUTO: 0.6 10E9/L (ref 0–1.3)
MONOCYTES NFR BLD AUTO: 2.7 %
MUCOUS THREADS #/AREA URNS LPF: PRESENT /LPF
NEUTROPHILS # BLD AUTO: 17.7 10E9/L (ref 1.6–8.3)
NEUTROPHILS NFR BLD AUTO: 82.5 %
NITRATE UR QL: NEGATIVE
NRBC # BLD AUTO: 0 10*3/UL
NRBC BLD AUTO-RTO: 0 /100
PH UR STRIP: 6 PH (ref 5–7)
PLATELET # BLD AUTO: 343 10E9/L (ref 150–450)
PLATELET # BLD EST: ABNORMAL 10*3/UL
POTASSIUM SERPL-SCNC: 3.4 MMOL/L (ref 3.4–5.3)
RBC # BLD AUTO: 4.04 10E12/L (ref 3.8–5.2)
RBC #/AREA URNS AUTO: <1 /HPF (ref 0–2)
SARS-COV-2 PCR COMMENT: NORMAL
SARS-COV-2 RNA SPEC QL NAA+PROBE: NORMAL
SARS-COV-2 RNA SPEC QL NAA+PROBE: NORMAL
SODIUM SERPL-SCNC: 132 MMOL/L (ref 133–144)
SOURCE: ABNORMAL
SP GR UR STRIP: 1.01 (ref 1–1.03)
SPECIMEN SOURCE: ABNORMAL
SPECIMEN SOURCE: NORMAL
SPECIMEN SOURCE: NORMAL
UROBILINOGEN UR STRIP-MCNC: NORMAL MG/DL (ref 0–2)
WBC # BLD AUTO: 21.5 10E9/L (ref 4–11)
WBC #/AREA URNS AUTO: <1 /HPF (ref 0–5)

## 2020-04-07 PROCEDURE — 25000132 ZZH RX MED GY IP 250 OP 250 PS 637: Performed by: EMERGENCY MEDICINE

## 2020-04-07 PROCEDURE — 80048 BASIC METABOLIC PNL TOTAL CA: CPT | Performed by: EMERGENCY MEDICINE

## 2020-04-07 PROCEDURE — 25000128 H RX IP 250 OP 636: Performed by: EMERGENCY MEDICINE

## 2020-04-07 PROCEDURE — 87086 URINE CULTURE/COLONY COUNT: CPT | Performed by: EMERGENCY MEDICINE

## 2020-04-07 PROCEDURE — 25000125 ZZHC RX 250: Performed by: EMERGENCY MEDICINE

## 2020-04-07 PROCEDURE — 87635 SARS-COV-2 COVID-19 AMP PRB: CPT | Performed by: EMERGENCY MEDICINE

## 2020-04-07 PROCEDURE — 83605 ASSAY OF LACTIC ACID: CPT | Performed by: EMERGENCY MEDICINE

## 2020-04-07 PROCEDURE — 71275 CT ANGIOGRAPHY CHEST: CPT

## 2020-04-07 PROCEDURE — 87040 BLOOD CULTURE FOR BACTERIA: CPT | Performed by: EMERGENCY MEDICINE

## 2020-04-07 PROCEDURE — 71045 X-RAY EXAM CHEST 1 VIEW: CPT

## 2020-04-07 PROCEDURE — 85025 COMPLETE CBC W/AUTO DIFF WBC: CPT | Performed by: EMERGENCY MEDICINE

## 2020-04-07 PROCEDURE — 81001 URINALYSIS AUTO W/SCOPE: CPT | Performed by: EMERGENCY MEDICINE

## 2020-04-07 PROCEDURE — 87493 C DIFF AMPLIFIED PROBE: CPT | Performed by: EMERGENCY MEDICINE

## 2020-04-07 PROCEDURE — 99285 EMERGENCY DEPT VISIT HI MDM: CPT | Mod: 25

## 2020-04-07 RX ORDER — SODIUM CHLORIDE 9 MG/ML
INJECTION, SOLUTION INTRAVENOUS ONCE
Status: DISCONTINUED | OUTPATIENT
Start: 2020-04-07 | End: 2020-04-07 | Stop reason: HOSPADM

## 2020-04-07 RX ORDER — VANCOMYCIN HYDROCHLORIDE 50 MG/ML
125 KIT ORAL ONCE
Status: COMPLETED | OUTPATIENT
Start: 2020-04-07 | End: 2020-04-07

## 2020-04-07 RX ORDER — IOPAMIDOL 755 MG/ML
58 INJECTION, SOLUTION INTRAVASCULAR ONCE
Status: COMPLETED | OUTPATIENT
Start: 2020-04-07 | End: 2020-04-07

## 2020-04-07 RX ORDER — VANCOMYCIN HYDROCHLORIDE 50 MG/ML
125 KIT ORAL 4 TIMES DAILY
Qty: 140 ML | Refills: 0 | Status: SHIPPED | OUTPATIENT
Start: 2020-04-07 | End: 2020-04-21

## 2020-04-07 RX ADMIN — IOPAMIDOL 58 ML: 755 INJECTION, SOLUTION INTRAVENOUS at 18:07

## 2020-04-07 RX ADMIN — VANCOMYCIN HYDROCHLORIDE 125 MG: KIT at 20:32

## 2020-04-07 RX ADMIN — SODIUM CHLORIDE 60 ML: 9 INJECTION, SOLUTION INTRAVENOUS at 18:07

## 2020-04-07 ASSESSMENT — ENCOUNTER SYMPTOMS
SHORTNESS OF BREATH: 1
WEAKNESS: 1
DIARRHEA: 1
COUGH: 1
FREQUENCY: 1
FATIGUE: 1

## 2020-04-07 NOTE — ED TRIAGE NOTES
Pt lives in her own home with her . Pt has had cough since December, today pt was not able to walk and is having frequent urination.

## 2020-04-07 NOTE — ED AVS SNAPSHOT
Emergency Department  [address] [facility] [address]  Phone:  [phone]  Fax:  [phone]                                    Verito Chicas   MRN: [id number]    Department:   Emergency Department   Date of Visit:  4/7/2020           After Visit Summary Signature Page    I have received my discharge instructions, and my questions have been answered. I have discussed any challenges I see with this plan with the nurse or doctor.    ..........................................................................................................................................  Patient/Patient Representative Signature      ..........................................................................................................................................  Patient Representative Print Name and Relationship to Patient    ..................................................               ................................................  Date                                   Time    ..........................................................................................................................................  Reviewed by Signature/Title    ...................................................              ..............................................  Date                                               Time          22EPIC Rev 08/18

## 2020-04-07 NOTE — ED PROVIDER NOTES
History     Chief Complaint:  Cough; Generalized Weakness    The history is provided by a relative. The history is limited by the condition of the patient.      Verito Chicas is a 86 year old female with a history of Alzheimer's who presents from home with generalized weakness, cough, and dyspnea. History taken from patient's daughter after the patient arrived, as the patient is a poor historian due to memory issues. The patient has dealing with a cough intermittently since December. She has seen her primary care doctor at [facility] a couple of times since then and most recently last week where she was placed on Augmentin. They also thought they heard wheezing so she was placed on a Breo inhaler. Patient's daughter states she's been sleeping more than normal. Has been getting up to take medications and eat meals, however that is it. She has had increased urinary frequency in the last day or two, and was noted to be incontinent today. The daughter was unsure if this was because Mercedes was unable to get up to use the restroom or if she has a UTI. The patient's daughter also noted shallow breathing today, she states when she walked into the kitchen to get breakfast she felt she was very stiff this morning and couldn't move. The patient's daughter has been going to the patient's house where she lives with her  to help over the last couple weeks, and as far as she knows, the patient has not been exposed to sick contacts. Patient has not complained of any pain, and of note, the patient was noted to have diarrhea in her depends upon arrival.    Allergies:  Atenolol  Beta-Blockers     Medications:    amlodipine  cetirizine  donepezil  Cymbalta  hydrochlorothiazide  labetalol  lisinopril  omeprazole  simvastatin   fexofenadine  ezetimibe     Past Medical History:    Hypertension  Dyslipidemia  GERD  Depression  Tobacco use  Alzheimer Disease  Cancer    Past Surgical History:     Appendectomy  Biopsy of lumpectomy right breast  Colonoscopy  Dental Extractions  Eye surgery, bilateral cataract  Orthopedic surgery, left knee arthroscopy, left shoulder fracture, left wrist fracture    Family History:    Colon Cancer, Brother    Social History:  Tobacco use: Light use, 0.25 packs/day, cigarettes  Alcohol use: no  Drug use: no  Presents with daughter  Marital Status:   [2]    Review of Systems   Constitutional: Positive for fatigue.   Respiratory: Positive for cough and shortness of breath.    Gastrointestinal: Positive for diarrhea.   Genitourinary: Positive for frequency.        Incontinent today   Neurological: Positive for weakness (Generalized).   All other systems reviewed and are negative.      Physical Exam     Patient Vitals for the past 24 hrs:   BP Temp Temp src Pulse Heart Rate Resp SpO2   04/07/20 2035 -- -- -- -- 74 25 93 %   04/07/20 2030 (!) 176/74 -- -- 71 73 20 --   04/07/20 1930 (!) 148/72 -- -- 69 75 16 97 %   04/07/20 1900 (!) 164/72 -- -- 75 83 27 --   04/07/20 1830 (!) 169/67 -- -- 75 77 16 95 %   04/07/20 1745 (!) 163/73 -- -- 71 73 17 99 %   04/07/20 1730 (!) 165/67 -- -- 68 68 18 94 %   04/07/20 1715 (!) 161/60 -- -- 65 65 18 92 %   04/07/20 1700 (!) 161/66 -- -- 66 66 19 96 %   04/07/20 1645 (!) 152/66 -- -- 66 -- -- 98 %   04/07/20 1619 (!) 144/61 99.3  F (37.4  C) Oral 64 64 18 91 %       Physical Exam  Physical Exam   Constitutional:  Ill-appearing 87 Y/O female, very weak.  HENT:   Mouth/Throat:   Oropharynx is clear and moist.   Eyes:    Conjunctivae normal and EOM are normal. Pupils are equal, round, and reactive to light.   Neck:    Normal range of motion.   Cardiovascular: Normal rate, regular rhythm and normal heart sounds.  Exam reveals no gallop and no friction rub.  No murmur heard.  Pulmonary/Chest:  Trace crackles in right base  Abdominal:   Soft. Bowel sounds are normal. Patient exhibits no mass. There is no tenderness. There is no rebound and no  guarding.   Musculoskeletal:  Normal range of motion. Patient exhibits no edema.   Neurological:   very weak generally, requires assistance to sit up in bed. However, Is noted to get up and walk to the bathroom multiple times on her own  Skin:   Skin is warm and dry. No rash noted. No erythema.   Psychiatric:   Unable to assess    Emergency Department Course   Imaging:  Radiology findings were communicated with the patient and family who voiced understanding of the findings.    CT Chest (PE) Abdomen Pelvis w Contrast  IMPRESSION:  1.  No evidence for pulmonary embolism. Assessment limited by motion  artifact.  2.  A few small bowel loops mildly distended with fluid may relate to  an enteritis.  3.  Incidental note of small bowel to small bowel intussusception at  the left abdomen without associated obstruction.  4.  Diffuse vascular calcifications including the coronary arteries  and aorta and its branches.  5.  Stable but indeterminant right ovarian cyst.    XR Chest Port 1 View  IMPRESSION: No acute cardiopulmonary disease.    Readings per radiology.     Laboratory:  Laboratory findings were communicated with the patient and family who voiced understanding of the findings.    CBC: WBC: 21.5 (H), HGB: 12.0, PLT: 343    BMP: Glucose 131 (H) Na 132 (L), GFR: 56 (L) o/w WNL (Creatinine: 0.93)    Lactic Acid: 1.1 - Resulted at 1650    Clostridium difficile toxin B PCR Positive (A)    UA with Microscopic: Protein Albumin Urine 10 (A), Mucous Urine: Present (A) o/w WNL  Urine Culture pending    Blood cultures x2: pending    SARS-CoV-2 COVID-19 virus RT-PCR pending      Procedures  None    Interventions:  2032 Vancomycin 125 mg PO    Emergency Department Course:  Past medical records, nursing notes, and vitals reviewed.    1626 I obtained a history from the patient's daughter via phone call and also performed an exam of the patient as documented above.     IV was inserted and blood was drawn for laboratory testing,  results above.    The patient provided a urine sample here in the emergency department. This was sent for laboratory testing, findings above.    Stool sample was obtained and sent for laboratory analysis, findings above.    Portable chest x-ray obtained, findings above.     The patient was sent for a CT chest/abdomen/pelvis while in the emergency department, results above.     2002 I spoke with the patient's daughter regarding her results in the ED, as well as disposition. After shared decision making with daughter, we agreed to trial the patient on outpatient antibiotics. Strict return precautions were discussed.     2020 I rechecked the patient and discussed the results of her workup thus far.     Findings and plan explained to the Patient and daughter. Patient discharged home with instructions regarding supportive care, medications, and reasons to return. The importance of close follow-up was reviewed. The patient was prescribed vancomycin.     I personally reviewed the laboratory and imaging results with the Patient and daughter and answered all related questions prior to discharge.     Impression & Plan   Covid-19  Verito Chicas was evaluated during a global COVID-19 pandemic, which necessitated consideration that the patient might be at risk for infection with the SARS-CoV-2 virus that causes COVID-19.   Applicable protocols for evaluation were followed during the patient's care.    Medical Decision Making:  Verito Chicas is a 86 year old female presenting with weakness, shallow breathing, and urinary frequency in the setting of respiratory illness for which she is on Augmentin. Her physical exam did not show any focal neurological deficit. She is generally very weak. She was not in any respiratory distress. Her abdominal exam showed no focal pain/surgical abdomen. Blood work and stool were obtained. Her white blood cell count is elevated. Her hemoglobin is WNL. She has no evidence of renal  failure or acute metabolic derangement. Lactic acid was noted to be normal. Urine also showed no evidence of infection. There was some stool found in her diaper which did come back positive for C. Diff. while she was in the emergency room. I did CT her chest and abdomen due to her respiratory complaints and her diarrhea in the setting of antibiotics. There is no PE or pneumonia. There appears to be enteritis which is what clinically we saw as well. There is an incidental note of an intussusception but there is no obstruction and she has no pain therefore I believe this finding to be incidental. We did test her for COVID as well and those results are pending. Chest XR, however, shows no sign of pneumonia.    I spoke with the patient's family members and discussed findings with them. At this point, she has C. Diff. probably from her Augmentin. I do not see a need for her to be on it, so I will have her stop this. I discussed with them admission under observation status or to have them go home and, in the setting of the pandemic and fear of worsening dementia while in hospital, they elected to bring her home. The daughter is there to take care of the patient. The daughter states that she will send her back to the emergency room if she is not taking her medications, vomits, or has worsening condition. I did give her the first dose of vancomycin in the ED and she took this without problems. She has been ambulatory, up and down to use the restroom multiple times. She does not have weakness that would prohibit her from going home. At this point, we will try her on an outpatient course of antibiotics. Return precautions reviewed. I suspect she could have COVID as well, so I discussed isolation precautions at home as well.      Diagnosis:    ICD-10-CM    1. C. difficile colitis  A04.72 COVID-19 Virus (Coronavirus) by PCR Nasopharyngeal     SARS-CoV-2 COVID-19 Virus (Coronavirus) RT-PCR     SARS-CoV-2 COVID-19 Virus  (Coronavirus) RT-PCR   2. Cough  R05        Disposition:  Discharged to home.    Discharge Medications:  Discharge Medication List as of 4/7/2020  8:56 PM      START taking these medications    Details   vancomycin (FIRVANQ) 50 MG/ML oral solution Take 2.5 mLs (125 mg) by mouth 4 times daily for 14 days, Disp-140 mL,R-0, E-Prescribe             Scribe Disclosure:  IDe, am serving as a scribe at 6:37 PM on 4/7/2020 to document services personally performed by Yvette Zavala MD based on my observations and the provider's statements to me.     I, Siena Bergeron, am serving as a scribe on 4/7/2020 at 7:04 PM to personally document services performed by Yvette Zavala MD based on my observations and the provider's statements to me.       4/7/2020    EMERGENCY DEPARTMENT       Yvette Zavala MD  04/11/20 8801

## 2020-04-07 NOTE — ED NOTES
Bed: ED06  Expected date:   Expected time:   Means of arrival:   Comments:  Peggy 3 86F - flu sx - not confirmed COVID

## 2020-04-08 ENCOUNTER — TELEPHONE (OUTPATIENT)
Dept: EMERGENCY MEDICINE | Facility: CLINIC | Age: 85
End: 2020-04-08

## 2020-04-08 ENCOUNTER — DOCUMENTATION ONLY (OUTPATIENT)
Dept: OTHER | Facility: CLINIC | Age: 85
End: 2020-04-08

## 2020-04-08 LAB
BACTERIA SPEC CULT: NO GROWTH
Lab: NORMAL
SPECIMEN SOURCE: NORMAL

## 2020-04-08 NOTE — TELEPHONE ENCOUNTER
[facility] Emergency Department Lab result notification:    Reason for call  Medication ordered by ED provider not available at pharmacy.    ED visit Date: 4/7/2020    Current symptoms  10:08AM: Patient's daughter Yvette states that the pharmacy that the patient's medication was sent to, does not carry the medication in liquid form (Vancomycin). Is wondering if she could take a pill form or if she should get the liquid. Also states that the patient's son is currently talking with the ED directly about this.   Recommendations/Instructions  Advised to go by what the ED states as the patient's son is currently speaking with them on another line.   Advised that medications can be transferred to other pharmacies that might have the medication in stock.   The daughter is comfortable with this information, she will wait to see what the ED says.  Advised to call back if further questions or concerns.   11:32AM: The patient's daughter was called to give her the negative COVID 19 results.   The daughter also states that they have the Vancomycin figured out. She has no further questions.     Contact your PCP clinic or return to the Emergency department if your:    Symptoms worsen or other concerning symptom's.    [RN Name]  Isabel Harmon RN  [facility] [facility] [facility] RN  Lung Nodule and ED Lab Result RN  Epic pool (ED late result f/u RN): P [id number]  FV INCIDENTAL RADIOLOGY F/U NURSES: P [id number]  # [phone]      Copy of Lab result   SARS-CoV-2 COVID-19 Virus (Coronavirus) RT-PCR Nasopharyngeal [[id number]] (Order [id number])   Order Requisition     SARS-CoV-2 COVID-19 Virus (Coronavirus) RT-PCR Nasopharyngeal (Order #[id number]) on 4/7/20    Exam Information     Exam Date  Exam Time  Accession #  Results     4/7/20   4:34 PM  [id number]     Specimen Information:  Nasopharyngeal          Component  Collected  Lab    SARS-CoV-2 Virus Specimen Source  04/07/2020  4:34 PM  225    Nasopharyngeal    SARS-CoV-2  PCR Result  04/07/2020  4:34 PM  225    NEGATIVE: SARS-CoV-2 (COVID-19) RNA not detected, presumed negative    SARS-CoV-2 PCR Comment  04/07/2020  4:34 PM  225    The Simplexa COVID-10 direct PCR assay by [employer] on the [employer] instrument has been   given Emergency Use Authorization (EUA) for the in vitro qualitative detection of RNA from    the SARS-CoV-2 virus in nasopharyngeal swabs in viral transport medium from patients with    signs and symptoms of infection who are suspected of COVID-19.    Comment:    Additional testing and confirmation procedures should be performed in   consultation with public health and or other authorities to whom reporting is   required. Positive results should also be reported in accordance with local,   state, and federal regulations.   Performance is unknown in asymptomatic patients. This test was validated by [facility]. This   Laboratory is certified under the Clinical Laboratory Improvement Amendments   of 1988 (CLIA-88) as qualified to perform high complexity clinical laboratory   testing.

## 2020-04-08 NOTE — ED NOTES
The ER rec'd a fax with this patient's HCD forms.  I sent them on to [facility]oring choices and they will review them and scan them into the epic chart.

## 2020-04-08 NOTE — DISCHARGE INSTRUCTIONS
Please use the information at the end of this document to sign up for [facility] [employer] where you can get your results and a message about those results sent to you through the [employer] application. If you do not have mychart we will call you with your results but it may take longer.    Regardless of if you have been tested or not:  Patient who have symptoms (cough, fever, or shortness of breath), need to isolate for 7 days from when symptoms started AND 72 hours after fever resolves (without fever reducing medications) AND improvement of respiratory symptoms (whichever is longer).    Isolate yourself at home (in own room/own bathroom if possible)  Do Not allow any visitors  Do Not go to work or school  Do Not go to [employer],  centers, shopping, or other public places.  Do Not shake hands.  Avoid close and intimate contact with others (hugging, kissing).  Follow CDC recommendations for household cleaning of frequently touched services.     After the initial 7 days, continue to isolate yourself from household members as much as possible. To continue decrease the risk of community spread and exposure, you and any members of your household should limit activities in public for 14 days after starting home isolation.     You can reference the following CDC link for helpful home isolation/care tips:  https://www.cdc.gov/coronavirus/2019-ncov/downloads/10Things.pdf    Protect Others:  Cover Your Mouth and Nose with a mask, disposable tissue or wash cloth to avoid spreading germs to others.  Wash your hands and face frequently with soap and water    Call Back If: Breathing difficulty develops or you become worse.    For more information about COVID19 and options for caring for yourself at home, please visit the CDC website at https://www.cdc.gov/coronavirus/2019-ncov/about/steps-when-sick.html  For more options for care at [facility], please visit our website at  [email or web address]

## 2020-04-08 NOTE — ED NOTES
DATE:  4/7/2020   TIME OF RECEIPT FROM LAB:  1940  LAB TEST:  c-diff  LAB VALUE:  positive  RESULTS GIVEN WITH READ-BACK TO (PROVIDER):  Dr. Zavala  TIME LAB VALUE REPORTED TO PROVIDER:   1941

## 2020-04-13 LAB
BACTERIA SPEC CULT: NO GROWTH
BACTERIA SPEC CULT: NO GROWTH
SPECIMEN SOURCE: NORMAL
SPECIMEN SOURCE: NORMAL

## 2020-04-14 ENCOUNTER — PATIENT OUTREACH (OUTPATIENT)
Dept: CARE COORDINATION | Facility: CLINIC | Age: 85
End: 2020-04-14

## 2020-04-14 NOTE — PROGRESS NOTES
Clinic Care Coordination Contact  [facility]/Voicemail       Clinical Data: Care Coordinator Outreach  Outreach attempted x 1.  Left message on patient's voicemail with call back information and requested return call.  Plan: Care Coordinator will send care coordination introduction letter with care coordinator contact information and explanation of care coordination services via mail. Care Coordinator will try to reach patient again in 3-5 business days.    JUDIE Ashraf  Clinic Care Coordinator   [facility]   [phone]

## 2020-04-15 ENCOUNTER — PATIENT OUTREACH (OUTPATIENT)
Dept: CARE COORDINATION | Facility: CLINIC | Age: 85
End: 2020-04-15

## 2020-04-15 ASSESSMENT — ACTIVITIES OF DAILY LIVING (ADL): DEPENDENT_IADLS:: CLEANING;COOKING;MEDICATION MANAGEMENT;TRANSPORTATION

## 2020-04-15 NOTE — PROGRESS NOTES
Clinic Care Coordination Contact    Clinic Care Coordination Contact  OUTREACH    Referral Information:  Referral Source: ED Follow-Up    Primary Diagnosis: Psychosocial    Chief Complaint   Patient presents with     Clinic Care Coordination - Follow-up     LIONEL        Universal Utilization: [facility].   Clinic Utilization  Difficulty keeping appointments:: No  Compliance Concerns: No  No-Show Concerns: No  No PCP office visit in Past Year: No  Utilization    Last refreshed: 4/14/2020  9:46 AM:  Hospital Admissions 2           Last refreshed: 4/14/2020  9:46 AM:  ED Visits 2           Last refreshed: 4/14/2020  9:46 AM:  No Show Count (past year) 0              Current as of: 4/14/2020  9:46 AM              Clinical Concerns:  Current Medical Concerns:  Pt was recently in the emergency department and is doing ok. LIONEL talked with pt's daughter over the phone and she stated they are in need of more help in the home. They would like to stay in the house as long as possible, but would like to look into services in the community that can come into the house to help.      Current Behavioral Concerns: alzheimer's diagnosis which is causing memory concerns.     Education Provided to patient daughter: LIONEL provided the number to [employer] and the Alzheimer association. PT's daughter stated that she would contact to the two resources and see what they can do for her and her mother.   Pain  Pain (GOAL):: No  Health Maintenance Reviewed: Due/Overdue   Health Maintenance Due   Topic Date Due     DTAP/TDAP/TD IMMUNIZATION (1 - Tdap) 12/11/1944     MEDICARE ANNUAL WELLNESS VISIT  12/11/1998     FALL RISK ASSESSMENT  12/11/1998     PNEUMOCOCCAL IMMUNIZATION 65+ LOW/MEDIUM RISK (1 of 2 - PCV13) 12/11/1998     ZOSTER IMMUNIZATION (2 of 3) 11/02/2010     INFLUENZA VACCINE (1) 09/01/2019     PHQ-2  01/01/2020       Clinical Pathway: None    Medication Management:       Functional Status:  Dependent ADLs::  Bathing  Dependent IADLs:: Cleaning, Cooking, Medication Management, Transportation  Bed or wheelchair confined:: No  Fallen 2 or more times in the past year?: No  Any fall with injury in the past year?: No    Living Situation:  Current living arrangement:: I live in a private home with spouse  Type of residence:: Private home - stairs    Lifestyle & Psychosocial Needs:        Diet:: Regular  Inadequate nutrition (GOAL):: No  Tube Feeding: No  Inadequate activity/exercise (GOAL):: No  Significant changes in sleep pattern (GOAL): No        Hinduism or spiritual beliefs that impact treatment:: No  Mental health DX:: No  Mental health management concern (GOAL):: No  Informal Support system:: Children, Spouse   Socioeconomic History     Marital status:      Spouse name: Not on file     Number of children: Not on file     Years of education: Not on file     Highest education level: Not on file     Tobacco Use     Smoking status: Light Tobacco Smoker     Packs/day: 0.25     Types: Cigarettes     Smokeless tobacco: Never Used   Substance and Sexual Activity     Alcohol use: No     Drug use: No        Resources and Interventions:  Current Resources:         Supplies used at home:: None  Equipment Currently Used at Home: other (see comments)(not assessed)    Advance Care Plan/Directive  Advanced Care Plans/Directives on file:: Yes  Type Advanced Care Plans/Directives: Advanced Directive - On File    Referrals Placed: Community Resources, [facility] Resources     Goals:   Goals        General    Other (pt-stated)     Notes - Note created  4/15/2020  9:59 AM by Mira Mathew BSW    Goal Statement: My mother needs extra support in the home in the next month.   Date Goal set: 4/15  Barriers: unsure where to start looking   Strengths: open to getting help   Date to Achieve By: 5/15/2020  Patient expressed understanding of goal: yes   Action steps to achieve this goal:  1. I will contact senior linkage line and ask for  resources   2. I will talk with alzheimer's association to see what services are available.                 Patient/Caregiver understanding: yes            Plan: 1) Pt's daughter will call out to the senior linkage line.   2) Pt's daughter will call out to the Alzheimer association.   3) SW will follow up with pt's daughter in 3 weeks.     Mira Mathew [facility]  Clinic Care Coordinator   [facility]   [phone]

## 2020-05-26 ENCOUNTER — PATIENT OUTREACH (OUTPATIENT)
Dept: CARE COORDINATION | Facility: CLINIC | Age: 85
End: 2020-05-26

## 2020-05-26 ASSESSMENT — ACTIVITIES OF DAILY LIVING (ADL): DEPENDENT_IADLS:: CLEANING;COOKING;MEDICATION MANAGEMENT;TRANSPORTATION

## 2020-05-26 NOTE — PROGRESS NOTES
Clinic Care Coordination Contact  [facility]/Voicemail    Referral Source: ED Follow-Up  Clinical Data: Care Coordinator Outreach  Outreach attempted x 1.  Left message on patient's voicemail with call back information and requested return call.  . Care Coordinator will try to reach patient again in 10 business days.    JUDIE Ashraf  Clinic Care Coordinator   [facility]   [phone]

## 2020-06-10 NOTE — PROGRESS NOTES
Clinic Care Coordination Contact  [facility]/Voicemail    Referral Source: ED Follow-Up  Clinical Data: Care Coordinator Outreach  Outreach attempted x 2.  Left message on patient's voicemail with call back information and requested return call.  Plan: Care Coordinator sent care coordination introduction letter via mail. Care Coordinator will try to reach patient again in 10 business days.    JUDIE Ashraf  Clinic Care Coordinator   [facility]   [phone]

## 2020-06-29 ENCOUNTER — PATIENT OUTREACH (OUTPATIENT)
Dept: CARE COORDINATION | Facility: CLINIC | Age: 85
End: 2020-06-29

## 2020-07-02 ENCOUNTER — PATIENT OUTREACH (OUTPATIENT)
Dept: CARE COORDINATION | Facility: CLINIC | Age: 85
End: 2020-07-02

## 2020-07-08 ENCOUNTER — PATIENT OUTREACH (OUTPATIENT)
Dept: CARE COORDINATION | Facility: CLINIC | Age: 85
End: 2020-07-08

## 2020-07-16 ENCOUNTER — APPOINTMENT (OUTPATIENT)
Dept: CARE COORDINATION | Facility: CLINIC | Age: 85
End: 2020-07-16
Payer: COMMERCIAL

## 2020-07-16 ENCOUNTER — PATIENT OUTREACH (OUTPATIENT)
Dept: CARE COORDINATION | Facility: CLINIC | Age: 85
End: 2020-07-16

## 2020-08-17 ENCOUNTER — PATIENT OUTREACH (OUTPATIENT)
Dept: CARE COORDINATION | Facility: CLINIC | Age: 85
End: 2020-08-17

## 2020-09-21 ENCOUNTER — PATIENT OUTREACH (OUTPATIENT)
Dept: CARE COORDINATION | Facility: CLINIC | Age: 85
End: 2020-09-21

## 2020-10-08 ENCOUNTER — PATIENT OUTREACH (OUTPATIENT)
Dept: CARE COORDINATION | Facility: CLINIC | Age: 85
End: 2020-10-08

## 2020-10-08 NOTE — PROGRESS NOTES
Clinic Care Coordination Contact  [facility]/Voicemail       Clinical Data: Care Coordinator Outreach  Outreach attempted x 1.  Left message on patients daughter voicemail with call back information and requested return call.  . Care Coordinator will try to reach patient again in 1 month.

## 2020-11-16 ENCOUNTER — PATIENT OUTREACH (OUTPATIENT)
Dept: CARE COORDINATION | Facility: CLINIC | Age: 85
End: 2020-11-16

## 2020-11-16 ENCOUNTER — APPOINTMENT (OUTPATIENT)
Dept: CARE COORDINATION | Facility: CLINIC | Age: 85
End: 2020-11-16
Payer: COMMERCIAL

## 2020-11-16 NOTE — PROGRESS NOTES
"Clinic Care Coordination Contact    Follow Up Progress Note      Assessment: PC to pt's daughter, Yvette. Mercedes had been having hearing issues but realized today that it was a malfunction in the part of her hearing aid. Yvette stated that Mercedes had been \"acting out\" in the last month or so but then realized her behaviors were related to her  being in the hospital and feeling lonely. Per Yvette, Mercedes continues to feel a bit lonely as her  is at home recuperating so Yvette and her sibling are creating activities to keep Mercedes engaged. At this time, there are no concerns or needs for CC.     Goals addressed this encounter:   Goals Addressed                 This Visit's Progress       Patient Stated      COMPLETED: Other (pt-stated)        Goal Statement: My mother needs extra support in the home in the next month.   Date Goal set: 4/15  Barriers: unsure where to start looking   Strengths: open to getting help   Date to Achieve By: 5/15/2020  Patient expressed understanding of goal: yes   Action steps to achieve this goal:  1. I will contact senior linkage line and ask for resources   2. I will talk with alzheimer's association to see what services are available.                  Intervention/Education provided during outreach: None     Outreach Frequency: monthly    Plan:   Continue with activities to facilitate emotional engagement and fulfillment.   Care Coordinator will follow up in 1 month. [facility] will close out CC after next outreach due to pt being out of scope  "

## 2020-12-17 ENCOUNTER — PATIENT OUTREACH (OUTPATIENT)
Dept: CARE COORDINATION | Facility: CLINIC | Age: 85
End: 2020-12-17

## 2021-07-07 ENCOUNTER — TRANSFERRED RECORDS (OUTPATIENT)
Dept: HEALTH INFORMATION MANAGEMENT | Facility: CLINIC | Age: 86
End: 2021-07-07

## 2021-07-07 ENCOUNTER — HOSPITAL ENCOUNTER (OUTPATIENT)
Facility: CLINIC | Age: 86
Setting detail: OBSERVATION
Discharge: HOME | End: 2021-07-09
Attending: HOSPITALIST | Admitting: HOSPITALIST
Payer: COMMERCIAL

## 2021-07-07 DIAGNOSIS — K92.2 GASTROINTESTINAL HEMORRHAGE, UNSPECIFIED GASTROINTESTINAL HEMORRHAGE TYPE: Primary | ICD-10-CM

## 2021-07-07 LAB
ALBUMIN SERPL-MCNC: 3 G/DL (ref 3.4–5)
ALP SERPL-CCNC: 59 U/L (ref 40–150)
ALT SERPL W P-5'-P-CCNC: 16 U/L (ref 0–50)
ANION GAP SERPL CALCULATED.3IONS-SCNC: 7 MMOL/L (ref 3–14)
AST SERPL W P-5'-P-CCNC: 10 U/L (ref 0–45)
BILIRUB DIRECT SERPL-MCNC: 0.1 MG/DL (ref 0–0.2)
BILIRUB SERPL-MCNC: 0.5 MG/DL (ref 0.2–1.3)
BUN SERPL-MCNC: 16 MG/DL (ref 7–30)
CALCIUM SERPL-MCNC: 9.4 MG/DL (ref 8.5–10.1)
CHLORIDE SERPL-SCNC: 105 MMOL/L (ref 94–109)
CO2 SERPL-SCNC: 25 MMOL/L (ref 20–32)
CREAT SERPL-MCNC: 1.03 MG/DL (ref 0.52–1.04)
GFR SERPL CREATININE-BSD FRML MDRD: 49 ML/MIN/{1.73_M2}
GLUCOSE SERPL-MCNC: 113 MG/DL (ref 70–99)
HGB BLD-MCNC: 9.7 G/DL (ref 11.7–15.7)
INR PPP: 1.06 (ref 0.86–1.14)
LABORATORY COMMENT REPORT: NORMAL
POTASSIUM SERPL-SCNC: 2.9 MMOL/L (ref 3.4–5.3)
PROT SERPL-MCNC: 6.3 G/DL (ref 6.8–8.8)
SARS-COV-2 RNA RESP QL NAA+PROBE: NEGATIVE
SODIUM SERPL-SCNC: 137 MMOL/L (ref 133–144)
SPECIMEN SOURCE: NORMAL

## 2021-07-07 PROCEDURE — 80048 BASIC METABOLIC PNL TOTAL CA: CPT | Performed by: NURSE PRACTITIONER

## 2021-07-07 PROCEDURE — 85018 HEMOGLOBIN: CPT | Performed by: NURSE PRACTITIONER

## 2021-07-07 PROCEDURE — 85610 PROTHROMBIN TIME: CPT | Performed by: NURSE PRACTITIONER

## 2021-07-07 PROCEDURE — 83735 ASSAY OF MAGNESIUM: CPT | Performed by: INTERNAL MEDICINE

## 2021-07-07 PROCEDURE — 85018 HEMOGLOBIN: CPT | Performed by: INTERNAL MEDICINE

## 2021-07-07 PROCEDURE — 99220 PR INITIAL OBSERVATION CARE,LEVEL III: CPT | Performed by: INTERNAL MEDICINE

## 2021-07-07 PROCEDURE — G0378 HOSPITAL OBSERVATION PER HR: HCPCS

## 2021-07-07 PROCEDURE — 96375 TX/PRO/DX INJ NEW DRUG ADDON: CPT

## 2021-07-07 PROCEDURE — 96376 TX/PRO/DX INJ SAME DRUG ADON: CPT

## 2021-07-07 PROCEDURE — G0379 DIRECT REFER HOSPITAL OBSERV: HCPCS

## 2021-07-07 PROCEDURE — 250N000011 HC RX IP 250 OP 636: Performed by: NURSE PRACTITIONER

## 2021-07-07 PROCEDURE — 96374 THER/PROPH/DIAG INJ IV PUSH: CPT

## 2021-07-07 PROCEDURE — 80076 HEPATIC FUNCTION PANEL: CPT | Performed by: NURSE PRACTITIONER

## 2021-07-07 PROCEDURE — 87635 SARS-COV-2 COVID-19 AMP PRB: CPT | Performed by: INTERNAL MEDICINE

## 2021-07-07 PROCEDURE — 36415 COLL VENOUS BLD VENIPUNCTURE: CPT | Performed by: INTERNAL MEDICINE

## 2021-07-07 PROCEDURE — C9113 INJ PANTOPRAZOLE SODIUM, VIA: HCPCS | Performed by: NURSE PRACTITIONER

## 2021-07-07 PROCEDURE — 258N000001 HC RX 258: Performed by: INTERNAL MEDICINE

## 2021-07-07 PROCEDURE — 250N000011 HC RX IP 250 OP 636: Performed by: INTERNAL MEDICINE

## 2021-07-07 PROCEDURE — 36415 COLL VENOUS BLD VENIPUNCTURE: CPT | Performed by: NURSE PRACTITIONER

## 2021-07-07 RX ORDER — CARBOXYMETHYLCELLULOSE SODIUM 5 MG/ML
1 SOLUTION/ DROPS OPHTHALMIC 4 TIMES DAILY PRN
Status: DISCONTINUED | OUTPATIENT
Start: 2021-07-07 | End: 2021-07-09 | Stop reason: HOSPADM

## 2021-07-07 RX ORDER — POTASSIUM CHLORIDE 7.45 MG/ML
10 INJECTION INTRAVENOUS
Status: COMPLETED | OUTPATIENT
Start: 2021-07-07 | End: 2021-07-08

## 2021-07-07 RX ORDER — ACETAMINOPHEN 650 MG/1
650 SUPPOSITORY RECTAL EVERY 4 HOURS PRN
Status: DISCONTINUED | OUTPATIENT
Start: 2021-07-07 | End: 2021-07-09 | Stop reason: HOSPADM

## 2021-07-07 RX ORDER — ONDANSETRON 2 MG/ML
4 INJECTION INTRAMUSCULAR; INTRAVENOUS EVERY 6 HOURS PRN
Status: DISCONTINUED | OUTPATIENT
Start: 2021-07-07 | End: 2021-07-09 | Stop reason: HOSPADM

## 2021-07-07 RX ORDER — CARBOXYMETHYLCELLULOSE SODIUM 5 MG/ML
1 SOLUTION/ DROPS OPHTHALMIC 4 TIMES DAILY PRN
COMMUNITY

## 2021-07-07 RX ORDER — DONEPEZIL HYDROCHLORIDE 10 MG/1
10 TABLET, FILM COATED ORAL AT BEDTIME
Status: DISCONTINUED | OUTPATIENT
Start: 2021-07-07 | End: 2021-07-09 | Stop reason: HOSPADM

## 2021-07-07 RX ORDER — FLUTICASONE PROPIONATE 50 MCG
1 SPRAY, SUSPENSION (ML) NASAL EVERY MORNING
Status: DISCONTINUED | OUTPATIENT
Start: 2021-07-08 | End: 2021-07-09 | Stop reason: HOSPADM

## 2021-07-07 RX ORDER — ACETAMINOPHEN 325 MG/1
650 TABLET ORAL EVERY 4 HOURS PRN
Status: DISCONTINUED | OUTPATIENT
Start: 2021-07-07 | End: 2021-07-09 | Stop reason: HOSPADM

## 2021-07-07 RX ORDER — FLUTICASONE PROPIONATE 50 MCG
1 SPRAY, SUSPENSION (ML) NASAL EVERY MORNING
COMMUNITY

## 2021-07-07 RX ORDER — LOSARTAN POTASSIUM 100 MG/1
100 TABLET ORAL EVERY EVENING
COMMUNITY

## 2021-07-07 RX ORDER — DULOXETIN HYDROCHLORIDE 30 MG/1
30 CAPSULE, DELAYED RELEASE ORAL EVERY MORNING
Status: DISCONTINUED | OUTPATIENT
Start: 2021-07-08 | End: 2021-07-09 | Stop reason: HOSPADM

## 2021-07-07 RX ORDER — DONEPEZIL HYDROCHLORIDE 10 MG/1
10 TABLET, FILM COATED ORAL AT BEDTIME
COMMUNITY

## 2021-07-07 RX ORDER — LIDOCAINE 40 MG/G
CREAM TOPICAL
Status: DISCONTINUED | OUTPATIENT
Start: 2021-07-07 | End: 2021-07-09 | Stop reason: HOSPADM

## 2021-07-07 RX ORDER — IBUPROFEN 200 MG
800 TABLET ORAL EVERY 6 HOURS PRN
COMMUNITY

## 2021-07-07 RX ORDER — ONDANSETRON 4 MG/1
4 TABLET, ORALLY DISINTEGRATING ORAL EVERY 6 HOURS PRN
Status: DISCONTINUED | OUTPATIENT
Start: 2021-07-07 | End: 2021-07-09 | Stop reason: HOSPADM

## 2021-07-07 RX ADMIN — POTASSIUM CHLORIDE 10 MEQ: 7.46 INJECTION, SOLUTION INTRAVENOUS at 21:29

## 2021-07-07 RX ADMIN — PANTOPRAZOLE SODIUM 40 MG: 40 INJECTION, POWDER, FOR SOLUTION INTRAVENOUS at 18:18

## 2021-07-07 RX ADMIN — POTASSIUM CHLORIDE 10 MEQ: 7.46 INJECTION, SOLUTION INTRAVENOUS at 22:40

## 2021-07-07 RX ADMIN — DEXTROSE AND SODIUM CHLORIDE: 5; 450 INJECTION, SOLUTION INTRAVENOUS at 18:19

## 2021-07-07 ASSESSMENT — MIFFLIN-ST. JEOR: SCORE: 916.24

## 2021-07-07 NOTE — H&P
Admitted: 07/07/2021    PRIMARY CARE PROVIDER:  Dr. Skylar Douglas.    CHIEF COMPLAINT:  Transfer from outside hospital with blood per rectum and signs of colitis on imaging.    HISTORY OF PRESENT ILLNESS:  Ms. Verito Chicas is an 87-year-old female patient with history including Alzheimer dementia, hypertension, dyslipidemia, history of colon cancer, history of breast cancer, as well as history of ischemic colitis, who presented to outside hospital with bloody stools.  Please note that the patient does have some diagnosis of Alzheimer's dementia and although she does provide some history, much of the history is obtained from reviewing the outside records.  Her daughter is not present now.      Over the past 2-3 days, it was noted that the patient has had some intermittent bright red blood per rectum, numbering about 3 episodes.  Apparently, she has had some right low back pain the last few months.  She was brought to outside [facility] ER for further evaluation.  On initial evaluation there, she was afebrile with heart rate 55, respirations 18, blood pressure 90/43, and O2 saturation 98% on room air.  Laboratory evaluation there showed white count slightly elevated at 13.3, hemoglobin 11.0, platelets 215,000.  BMP is remarkable for creatinine slightly elevated to 1.27 and potassium slightly low at 3.2.  She did have a CT scan, the report of which is not available to me at this time but reportedly showed areas of thickening in the colon.  Given these issues, she was transferred to [facility] for further evaluation.    I saw the patient in her room.  She is awake and alert.  She knows she is in the hospital.  She is aware that she has been having blood per rectum.  Denies any chest pain.  No shortness of breath.  No cough.  Does note some abdominal cramping.  No fevers or chills.  Does note she has had some trouble sleeping recently.    PAST MEDICAL HISTORY:    1.  Alzheimer dementia.  She lives with her .  2.   Hypertension.  3.  Dyslipidemia.  4.  GERD.  5.  Depression/anxiety.  6.  History of colon cancer.  She had colonoscopy with polyp resection in 2019, and pathology subsequently did show some small area of invasive adenocarcinoma.  It is noted that this was completely resected, and she did not require any further treatment.  7.  History of breast cancer, status post right lumpectomy.  8.  History of ischemic colitis.    Past Medical History:   Diagnosis Date     Alzheimer disease (H)      Cancer (H)     breast cancer     GERD (gastroesophageal reflux disease)      Hypertension        PAST SURGICAL HISTORY:  Past Surgical History:   Procedure Laterality Date     APPENDECTOMY       BIOPSY      lumpectomy right breast     COLONOSCOPY N/A 1/20/2016    Procedure: COMBINED COLONOSCOPY, SINGLE OR MULTIPLE BIOPSY/POLYPECTOMY BY BIOPSY;  Surgeon: Flynn Pineda MD;  Location:  GI     COLONOSCOPY N/A 10/14/2019    Procedure: COLONOSCOPY, WITH POLYPECTOMY AND BIOPSY;  Surgeon: Rere Leigh MD;  Location:  GI     EXTRACTION(S) DENTAL N/A 8/20/2015    Procedure: EXTRACTION(S) DENTAL;  Surgeon: Fahad Webster DDS;  Location:  OR     EYE SURGERY      cataract bilat     ORTHOPEDIC SURGERY      arthroscopy knee left     ORTHOPEDIC SURGERY      shoulder surg, fx left     ORTHOPEDIC SURGERY      fx wrist left       ALLERGIES:  NO KNOWN DRUG ALLERGIES.     HOME MEDICATIONS:    Medications Prior to Admission   Medication Sig Dispense Refill Last Dose     amLODIPine (NORVASC) 5 MG tablet Take 5 mg by mouth daily    7/7/2021 at AM     carboxymethylcellulose PF (REFRESH PLUS) 0.5 % ophthalmic solution Place 1 drop into both eyes 4 times daily as needed for dry eyes    at PRN     cetirizine (ZYRTEC) 10 MG tablet Take 10 mg by mouth every evening   7/6/2021 at PM     donepezil (ARICEPT) 10 MG tablet Take 10 mg by mouth At Bedtime   7/6/2021 at PM     DULoxetine (CYMBALTA) 30 MG capsule Take 30 mg by mouth every morning     7/7/2021 at AM     fluticasone (FLONASE) 50 MCG/ACT nasal spray Spray 1 spray into both nostrils every morning   7/7/2021 at AM     fluticasone-vilanterol (BREO ELLIPTA) 200-25 MCG/INH inhaler Inhale 1 puff into the lungs every morning   7/7/2021 at AM     hydrochlorothiazide (HYDRODIURIL) 25 MG tablet Take 25 mg by mouth every morning    7/7/2021 at AM     ibuprofen (ADVIL/MOTRIN) 200 MG tablet Take 800 mg by mouth every 6 hours as needed for mild pain   Past Week at PRN     labetalol (NORMODYNE) 200 MG tablet Take 400 mg by mouth 2 times daily 2 x 200 mg    7/7/2021 at AM     losartan (COZAAR) 100 MG tablet Take 100 mg by mouth every evening   7/6/2021 at PM     omeprazole (PRILOSEC) 40 MG DR capsule Take 40 mg by mouth every morning    7/7/2021 at AM     simvastatin (ZOCOR) 40 MG tablet Take 20 mg by mouth At Bedtime 1/2 x 40 mg tab   7/6/2021 at PM       SOCIAL HISTORY:  The patient does not drink alcohol.  She smoked a half-pack per day for 20 years.  She quit smoking.  She is .  Has 5 children.  She is a homemaker.  Lives with .    FAMILY HISTORY:  Reviewed and not felt to be contributory.    REVIEW OF SYSTEMS:  As noted in the HPI.  Otherwise, 10-point review of systems negative.    PHYSICAL EXAMINATION:    VITAL SIGNS:  Temperature 97.6 degrees, heart rate 56, blood pressure 120/68, respiration 18.  GENERAL:  This is an 87-year-old female patient lying in bed.  She is conversant and friendly.  She does seem to have some short-term memory impairment.  HEENT:  Pupils equal, round, and reactive.  No scleral icterus or conjunctival injection.  Oropharynx -- no gross erythema.  NECK:  No bruits, JVD, adenopathy.  HEART:  Regular rhythm without murmurs, rubs, or gallops.  LUNGS:  Diminished at bases.  No crackles or wheeze.  ABDOMEN:  Soft, nontender, nondistended.  Positive bowel sounds.  No femoral bruits.  EXTREMITIES:  Show no edema.  NEUROLOGIC:  Reveals no gross focal motor or sensory  deficits.    DIAGNOSTIC DATA:  Labs from a [facility] outside hospital show hemoglobin 11.0, white count 13.2, platelets 215.  BMP showed creatinine 1.27, potassium 3.2, otherwise unremarkable.  CT report is not available at this time.  It apparently showed areas of colitis.            ASSESSMENT AND PLAN:    Ms. Verito Chicas is an 87-year-old female patient with history including Alzheimer's dementia, hypertension, dyslipidemia, GERD, depression/anxiety, history of colon cancer status post resection, and history of ischemic colitis, who presented to outside ER with bright red blood per rectum, noted to be mildly anemic, and a CT showing signs of colitis.     Bright red blood per rectum with CT imaging revealing possible colitis.    Anemia, suspect acute blood loss component.   * Of note, the patient does have history of ischemic colitis in the past in 2019.   * She presented to an outside ER through [facility] with bright red blood per rectum for 2-3 days.  On initial evaluation, she had soft blood pressure with systolic blood pressure in the 90s, otherwise, vitals were stable.  Hemoglobin was 11.0 with normal platelets.  She had a CT scan, no report is available, but it apparently showed signs of colitis.   - At this time, we will admit under observation.    - Keep n.p.o.  Order IV fluids.   - Order pantoprazole IV for now.  - Monitor CBC.  - Consider prbc transfusion if hgb </= 7.0 or if significant bleeding with hemodynamic instability or if symptomatic; she has been consented.  - Consult in [facility] to see if any further endoscopic evaluation is indicated.    Acute kidney injury, suspect prerenal.    Creatinine was 1.27 at the outside hospital.   - We will order IV fluids as above.   - Hold prior to admission hydrochlorothiazide and losartan for now.    Hypokalemia, possibly related to GI losses and diuretic (HCTZ).  Potassium was 3.2 at outside hospital.  - Order potassium replacement  protocol.  - Order magnesium level and replace as needed.  - Hold PTA HCTZ for now.    Leukocytosis, possibly reactive.    The patient had a white count of 13.2 at outside hospital.  She is afebrile.  No clear signs of infection at this time.   - We will check UA/UC.    - Treat above issues.  - Monitor CBC.    Alzheimer's dementia.    The patient has some memory impairment.  Does live with her .   - At this time, we will continue prior to admission donepezil.   - Re-orient as needed.  - Maintain normal day/night, sleep/wake cycles.  - Minimize sedating medications as able.    Benign essential hypertension.   - Hold prior to admission, amlodipine, HCTZ, labetalol and losartan for now given soft blood pressures and concern about GI bleeding/hypovolemia.    Gastroesophageal reflux disease.   - The patient will be on IV pantoprazole as above.  - Hold PTA omeprazole for now.    Hyperlipidemia.   - Resume simvastatin at home.    Depression/anxiety.   - Continue duloxetine.    Prophylaxis.  - Pneumoboots and ambulation.    Code status was discussed with the patient's daughter, and she is FULL CODE.      Angel Julian Jr., MD        D: 2021   T: 2021   MT: [facility]    Name:     ARANZA DOBSON  MRN:      [id number]        Account:     [id number]   :      1933           Admitted:    2021       Document: [id number]    cc:  Skylar Douglas MD

## 2021-07-07 NOTE — PHARMACY-ADMISSION MEDICATION HISTORY
Pharmacy Medication History  Admission medication history interview status for the 7/7/2021  admission is complete. See EPIC admission navigator for prior to admission medications     Location of Interview: Patient room  Medication history sources: Patient's family/friend (Daughter Yvette)    Significant changes made to the medication list:  Updated several doses    In the past week, patient estimated taking medication this percent of the time: greater than 90%    Additional medication history information:   Using OTC Ibuprofen 4 tabs PRN for pain     Medication reconciliation completed by provider prior to medication history? No    Time spent in this activity: 15 minutes     Prior to Admission medications    Medication Sig Last Dose Taking? Auth Provider   amLODIPine (NORVASC) 5 MG tablet Take 5 mg by mouth daily  7/7/2021 at AM Yes Unknown, Entered By History   carboxymethylcellulose PF (REFRESH PLUS) 0.5 % ophthalmic solution Place 1 drop into both eyes 4 times daily as needed for dry eyes  at PRN Yes Unknown, Entered By History   cetirizine (ZYRTEC) 10 MG tablet Take 10 mg by mouth every evening 7/6/2021 at PM Yes Unknown, Entered By History   donepezil (ARICEPT) 10 MG tablet Take 10 mg by mouth At Bedtime 7/6/2021 at PM Yes Unknown, Entered By History   DULoxetine (CYMBALTA) 30 MG capsule Take 30 mg by mouth every morning  7/7/2021 at AM Yes Unknown, Entered By History   fluticasone (FLONASE) 50 MCG/ACT nasal spray Spray 1 spray into both nostrils every morning 7/7/2021 at AM Yes Unknown, Entered By History   fluticasone-vilanterol (BREO ELLIPTA) 200-25 MCG/INH inhaler Inhale 1 puff into the lungs every morning 7/7/2021 at AM Yes Unknown, Entered By History   hydrochlorothiazide (HYDRODIURIL) 25 MG tablet Take 25 mg by mouth every morning  7/7/2021 at AM Yes Unknown, Entered By History   ibuprofen (ADVIL/MOTRIN) 200 MG tablet Take 800 mg by mouth every 6 hours as needed for mild pain Past Week at PRN Yes  Unknown, Entered By History   labetalol (NORMODYNE) 200 MG tablet Take 400 mg by mouth 2 times daily 2 x 200 mg  7/7/2021 at AM Yes Unknown, Entered By History   losartan (COZAAR) 100 MG tablet Take 100 mg by mouth every evening 7/6/2021 at PM Yes Unknown, Entered By History   omeprazole (PRILOSEC) 40 MG DR capsule Take 40 mg by mouth every morning  7/7/2021 at AM Yes Unknown, Entered By History   simvastatin (ZOCOR) 40 MG tablet Take 20 mg by mouth At Bedtime 1/2 x 40 mg tab 7/6/2021 at PM Yes Unknown, Entered By History       The information provided in this note is only as accurate as the sources available at the time of update(s)

## 2021-07-07 NOTE — H&P
INTERNAL MEDICINE H&P    H&P dictated:  #[id number]    Angel Julian Jr., MD  [phone] (p)  Text Page  Josh

## 2021-07-08 LAB
ALBUMIN UR-MCNC: 50 MG/DL
ANION GAP SERPL CALCULATED.3IONS-SCNC: 7 MMOL/L (ref 3–14)
APPEARANCE UR: CLEAR
BILIRUB UR QL STRIP: NEGATIVE
BUN SERPL-MCNC: 13 MG/DL (ref 7–30)
CALCIUM SERPL-MCNC: 10 MG/DL (ref 8.5–10.1)
CHLORIDE SERPL-SCNC: 105 MMOL/L (ref 94–109)
CO2 SERPL-SCNC: 23 MMOL/L (ref 20–32)
COLOR UR AUTO: ABNORMAL
CREAT SERPL-MCNC: 0.88 MG/DL (ref 0.52–1.04)
ERYTHROCYTE [DISTWIDTH] IN BLOOD BY AUTOMATED COUNT: 13 % (ref 10–15)
GFR SERPL CREATININE-BSD FRML MDRD: 59 ML/MIN/{1.73_M2}
GLUCOSE SERPL-MCNC: 101 MG/DL (ref 70–99)
GLUCOSE UR STRIP-MCNC: NEGATIVE MG/DL
HCT VFR BLD AUTO: 31 % (ref 35–47)
HGB BLD-MCNC: 10 G/DL (ref 11.7–15.7)
HGB BLD-MCNC: 10.5 G/DL (ref 11.7–15.7)
HGB BLD-MCNC: 9.5 G/DL (ref 11.7–15.7)
HGB UR QL STRIP: NEGATIVE
KETONES UR STRIP-MCNC: NEGATIVE MG/DL
LEUKOCYTE ESTERASE UR QL STRIP: NEGATIVE
MAGNESIUM SERPL-MCNC: 1.6 MG/DL (ref 1.6–2.3)
MCH RBC QN AUTO: 30.1 PG (ref 26.5–33)
MCHC RBC AUTO-ENTMCNC: 32.3 G/DL (ref 31.5–36.5)
MCV RBC AUTO: 93 FL (ref 78–100)
NITRATE UR QL: NEGATIVE
PH UR STRIP: 7 PH (ref 5–7)
PLATELET # BLD AUTO: 302 10E9/L (ref 150–450)
POTASSIUM SERPL-SCNC: 3.5 MMOL/L (ref 3.4–5.3)
RBC # BLD AUTO: 3.32 10E12/L (ref 3.8–5.2)
RBC #/AREA URNS AUTO: 1 /HPF (ref 0–2)
SODIUM SERPL-SCNC: 135 MMOL/L (ref 133–144)
SOURCE: ABNORMAL
SP GR UR STRIP: 1 (ref 1–1.03)
SQUAMOUS #/AREA URNS AUTO: 0 /HPF (ref 0–1)
UROBILINOGEN UR STRIP-MCNC: 0 MG/DL (ref 0–2)
WBC # BLD AUTO: 14.3 10E9/L (ref 4–11)
WBC #/AREA URNS AUTO: 1 /HPF (ref 0–5)

## 2021-07-08 PROCEDURE — 85018 HEMOGLOBIN: CPT | Performed by: NURSE PRACTITIONER

## 2021-07-08 PROCEDURE — 250N000011 HC RX IP 250 OP 636: Performed by: INTERNAL MEDICINE

## 2021-07-08 PROCEDURE — 250N000013 HC RX MED GY IP 250 OP 250 PS 637: Performed by: INTERNAL MEDICINE

## 2021-07-08 PROCEDURE — 81001 URINALYSIS AUTO W/SCOPE: CPT | Performed by: INTERNAL MEDICINE

## 2021-07-08 PROCEDURE — 250N000013 HC RX MED GY IP 250 OP 250 PS 637: Performed by: PHYSICIAN ASSISTANT

## 2021-07-08 PROCEDURE — 99225 PR SUBSEQUENT OBSERVATION CARE,LEVEL II: CPT | Performed by: PHYSICIAN ASSISTANT

## 2021-07-08 PROCEDURE — 85027 COMPLETE CBC AUTOMATED: CPT | Performed by: NURSE PRACTITIONER

## 2021-07-08 PROCEDURE — 80048 BASIC METABOLIC PNL TOTAL CA: CPT | Performed by: NURSE PRACTITIONER

## 2021-07-08 PROCEDURE — 96376 TX/PRO/DX INJ SAME DRUG ADON: CPT

## 2021-07-08 PROCEDURE — 250N000013 HC RX MED GY IP 250 OP 250 PS 637: Performed by: NURSE PRACTITIONER

## 2021-07-08 PROCEDURE — 250N000011 HC RX IP 250 OP 636: Performed by: NURSE PRACTITIONER

## 2021-07-08 PROCEDURE — 36415 COLL VENOUS BLD VENIPUNCTURE: CPT | Performed by: NURSE PRACTITIONER

## 2021-07-08 PROCEDURE — G0378 HOSPITAL OBSERVATION PER HR: HCPCS

## 2021-07-08 PROCEDURE — C9113 INJ PANTOPRAZOLE SODIUM, VIA: HCPCS | Performed by: NURSE PRACTITIONER

## 2021-07-08 PROCEDURE — 87086 URINE CULTURE/COLONY COUNT: CPT | Performed by: INTERNAL MEDICINE

## 2021-07-08 RX ORDER — LANOLIN ALCOHOL/MO/W.PET/CERES
3 CREAM (GRAM) TOPICAL
Status: DISCONTINUED | OUTPATIENT
Start: 2021-07-08 | End: 2021-07-08

## 2021-07-08 RX ORDER — POLYETHYLENE GLYCOL 3350 17 G/17G
17 POWDER, FOR SOLUTION ORAL DAILY
Status: DISCONTINUED | OUTPATIENT
Start: 2021-07-08 | End: 2021-07-09 | Stop reason: HOSPADM

## 2021-07-08 RX ORDER — QUETIAPINE FUMARATE 25 MG/1
25 TABLET, FILM COATED ORAL
Status: DISCONTINUED | OUTPATIENT
Start: 2021-07-08 | End: 2021-07-08

## 2021-07-08 RX ORDER — LANOLIN ALCOHOL/MO/W.PET/CERES
3 CREAM (GRAM) TOPICAL AT BEDTIME
Status: DISCONTINUED | OUTPATIENT
Start: 2021-07-08 | End: 2021-07-09 | Stop reason: HOSPADM

## 2021-07-08 RX ORDER — OLANZAPINE 10 MG/2ML
2.5 INJECTION, POWDER, FOR SOLUTION INTRAMUSCULAR DAILY PRN
Status: DISCONTINUED | OUTPATIENT
Start: 2021-07-08 | End: 2021-07-09 | Stop reason: HOSPADM

## 2021-07-08 RX ORDER — QUETIAPINE FUMARATE 25 MG/1
25 TABLET, FILM COATED ORAL AT BEDTIME
Status: DISCONTINUED | OUTPATIENT
Start: 2021-07-08 | End: 2021-07-09 | Stop reason: HOSPADM

## 2021-07-08 RX ADMIN — DULOXETINE HYDROCHLORIDE 30 MG: 30 CAPSULE, DELAYED RELEASE ORAL at 08:54

## 2021-07-08 RX ADMIN — POLYETHYLENE GLYCOL 3350 17 G: 17 POWDER, FOR SOLUTION ORAL at 14:03

## 2021-07-08 RX ADMIN — OMEPRAZOLE 40 MG: 20 CAPSULE, DELAYED RELEASE ORAL at 08:55

## 2021-07-08 RX ADMIN — DONEPEZIL HYDROCHLORIDE 10 MG: 10 TABLET ORAL at 20:00

## 2021-07-08 RX ADMIN — FLUTICASONE FUROATE AND VILANTEROL TRIFENATATE 1 PUFF: 200; 25 POWDER RESPIRATORY (INHALATION) at 09:00

## 2021-07-08 RX ADMIN — PANTOPRAZOLE SODIUM 40 MG: 40 INJECTION, POWDER, FOR SOLUTION INTRAVENOUS at 05:21

## 2021-07-08 RX ADMIN — ACETAMINOPHEN 650 MG: 325 TABLET, FILM COATED ORAL at 08:54

## 2021-07-08 RX ADMIN — Medication 2 CAPSULE: at 15:07

## 2021-07-08 RX ADMIN — MELATONIN 3 MG: 3 TAB ORAL at 20:00

## 2021-07-08 RX ADMIN — FLUTICASONE PROPIONATE 1 SPRAY: 50 SPRAY, METERED NASAL at 09:00

## 2021-07-08 RX ADMIN — QUETIAPINE 12.5 MG: 25 TABLET, FILM COATED ORAL at 01:33

## 2021-07-08 RX ADMIN — QUETIAPINE FUMARATE 25 MG: 25 TABLET ORAL at 20:00

## 2021-07-08 RX ADMIN — POTASSIUM CHLORIDE 10 MEQ: 7.46 INJECTION, SOLUTION INTRAVENOUS at 00:06

## 2021-07-08 RX ADMIN — DONEPEZIL HYDROCHLORIDE 10 MG: 10 TABLET ORAL at 01:33

## 2021-07-08 RX ADMIN — POTASSIUM CHLORIDE 10 MEQ: 7.46 INJECTION, SOLUTION INTRAVENOUS at 01:51

## 2021-07-08 NOTE — PROGRESS NOTES
Text paged MD regarding agitation and wanting to leave. Awaiting orders for prn agitation medication.

## 2021-07-08 NOTE — PROGRESS NOTES
GI NOTE    Patient has been followed by Dr. Randall GI  Please consult his group.    Aye Berman MD  [facility]  Cell  [phone] 8 AM-5 PM  Office

## 2021-07-08 NOTE — PLAN OF CARE
-vital signs normal or at patient baseline YES  -tolerating oral intake to maintain hydration NO, NPO  -returns to baseline functional status NO  -safe disposition plan has been identified NO    Orientation: Alert to self. Pleasantly confused. BL hearing aids.  VS/ O2/ IV: VSS on RA. GOLDEN K+ replacement.  Mobility: A1 w/ 2ww.   Pain Management: Denies pain.  Diet: NPO until GI eval.  Bowel/ Bladder: No BM on shift. Voiding adequately in BR.  Labs/ BG: HGB 9.7, K+ 2.9, Mg to be drawn.  Skin: Warm, C/D/I. Pale.  CMS: Intact.  Discharge/ Plan:  GI consult, HGB Q6H.

## 2021-07-08 NOTE — PROGRESS NOTES
[facility]    Medicine Progress Note - Hospitalist Service       Date of Admission:  7/7/2021    Assessment & Plan         Ms. Verito Chicas is an 87-year-old female patient with history including Alzheimer's dementia, hypertension, dyslipidemia, GERD, depression/anxiety, history of colon cancer status post resection, and history of ischemic colitis, who presented to outside ER with bright red blood per rectum, noted to be mildly anemic, and a CT showing signs of colitis.      Bright red blood per rectum with CT imaging revealing possible colitis, suspect ischemic.    Anemia, suspect acute blood loss component.   * Of note, the patient does have history of ischemic colitis in the past in 2019.   * She presented to an outside ER through [facility] with bright red blood per rectum for 2-3 days.  On initial evaluation, she had soft blood pressure with systolic blood pressure in the 90s, otherwise, vitals were stable.  Hemoglobin was 11.0 with normal platelets.  She had a CT scan with circumferential wall thickening of the descending colon with mild pericolonic fat stranding  -Hemoglobin trend 11--9.5  -No further bleeding.  Discussed plan of care with patient and daughter at bedside.  Likely etiology ischemic colitis.  Family would prefer to avoid colonoscopy.  -Advance diet as tolerated  -Pain control with tylenol  -Monitor Hgb, conditional transfusion orders in place     Acute kidney injury, resolved  Cr trend 1.27--0.88  - Continue IVF  - Hold prior to admission hydrochlorothiazide and losartan for now.     Hypokalemia, possibly related to GI losses and diuretic (HCTZ).  Potassium was 3.2 at outside hospital.  - Order potassium replacement protocol.  - Order magnesium level and replace as needed.  - Hold PTA HCTZ for now.     Leukocytosis, possibly reactive.    The patient had a white count of 13.2 at outside hospital.  She is afebrile.  No clear signs of infection at this time.   - WBC in  14.3 this am  - UA negative    - Treat above issues.  - Monitor CBC.     Alzheimer's dementia.    The patient has some memory impairment.  Does live with her .   - At this time, we will continue prior to admission donepezil.   - Re-orient as needed.  - Maintain normal day/night, sleep/wake cycles.  - Minimize sedating medications as able.  - Will increase Seroquel to 25 mg at bedtime. Add Melatonin    Benign essential hypertension.   - Hold prior to admission, amlodipine, HCTZ, labetalol and losartan for now given soft blood pressures and concern about GI bleeding/hypovolemia.  - Discussed with daughter. Will plan to hold HCTZ on discharge to avoid recurrent dehydration or recurrent ischemic colitis     Gastroesophageal reflux disease.   - The patient will be on IV pantoprazole as above.  - Hold PTA omeprazole for now.     Hyperlipidemia.   - Resume simvastatin at home.     Depression/anxiety.   - Continue duloxetine.       Diet: Advance Diet as Tolerated: Clear Liquid Diet    DVT Prophylaxis: Pneumatic Compression Devices  Arroyo Catheter: Not present  Central Lines: None  Code Status: Full Code      Disposition Plan   Expected discharge: Likely tomorrow pending improvement in abdominal pain, diarrhea, advancement of diet and stable Hgb     The patient's care was discussed with the Bedside Nurse, Patient and Patient's Family.    Trista Connolly PA-C  Hospitalist Service  [facility]  Securely message with the Vocera Web Console (learn more here)  Text page via [facility] Paging/Directory      Risk Factors Present on Admission                   ______________________________________________________________________    Interval History   History obtained mostly from patient's daughter at bedside.     Bleeding resolved. Still with some abdominal pain. No emesis. Family not interested in colonoscopy. Discussed likely etiology of ischemic colitis. Will advanced diet    Data reviewed today: I  reviewed all medications, new labs and imaging results over the last 24 hours. I personally reviewed no images or EKG's today.    Physical Exam   Vital Signs: Temp: 97.7  F (36.5  C) Temp src: Oral BP: (!) 145/69 Pulse: 62   Resp: 16 SpO2: 91 % O2 Device: None (Room air)    Weight: 116 lbs 6.4 oz  Physical Exam  Vitals signs and nursing note reviewed.   Constitutional:       General: She is not in acute distress.     Appearance: Normal appearance. She is not toxic-appearing.   HENT:      Head: Normocephalic and atraumatic.   Eyes:      General: No scleral icterus.  Cardiovascular:      Rate and Rhythm: Normal rate and regular rhythm.      Heart sounds: No murmur.   Pulmonary:      Effort: Pulmonary effort is normal.      Breath sounds: Normal breath sounds. No wheezing.   Abdominal:      General: Abdomen is flat. There is distension.      Palpations: Abdomen is soft.   Musculoskeletal: Normal range of motion.      Right lower leg: No edema.      Left lower leg: No edema.   Skin:     General: Skin is warm and dry.      Findings: No rash.   Neurological:      General: No focal deficit present.      Mental Status: She is alert. She is disoriented.      Cranial Nerves: No cranial nerve deficit.   Psychiatric:         Mood and Affect: Mood normal.         Behavior: Behavior normal.           Data

## 2021-07-08 NOTE — PLAN OF CARE
Pt A&Ox to self only, agitated and impulsive at times. Long arm to assist. VSS on RA. Hgb recheck 10.3. K+ infusion complete, recheck in AM. Voiding adequately. Up with sba, needs reminders to use walker. No pain noted. IV infusing. Will continue to monitor closely.

## 2021-07-08 NOTE — UTILIZATION REVIEW
Concurrent stay review; Secondary Review Determination    Under the authority of the Utilization Management Committee, the utilization review process indicated a secondary review on the above patient. The review outcome is based on review of the medical records, discussions with staff, and applying clinical experience noted on the date of the review.    (x) Observation Status Appropriate - Concurrent stay review        RATIONALE FOR DETERMINATION:  87-year-old female with history of Alzheimer's dementia, prior ischemic colitis, colon cancer who presented to the outlying hospital with some abdominal discomfort and bloody stools.  Imaging most consistent with a recurrent episode of ischemic colitis.  On hospital day 2: Vital signs stable, pain managed with Tylenol, hemoglobin plateaued between 9.5 and 10.  No further clinical hematochezia.  Observation care appropriate ensuring no significant further bleeding with plans for discharge tomorrow.    Patient is clinically improving and there is no clear indication to change patient's status to inpatient. The severity of illness, intensity of service provided, expected LOS and risk for adverse outcome make the care appropriate for observation.    This document was produced using voice recognition software    The information on this document is developed by the utilization review team in order for the business office to ensure compliance. This only denotes the appropriateness of proper admission status and does not reflect the quality of care rendered.    The definitions of Inpatient Status and Observation Status used in making the determination above are those provided in the CMS Coverage Manual, Chapter 1 and Chapter 6, section 70.4.    Sincerely,    Eddi Beckwith MD  Utilization Review  Physician Advisor  [facility].

## 2021-07-08 NOTE — CONSULTS
[facility]  Gastroenterology Consultation         Verito Chicas  [address] [place] [address]  87 year old female    Admission Date/Time: 7/7/2021  Primary Care Provider: Skylar Douglas  Referring / Attending Physician:  Trista Connolly PA-C    We were asked to see the patient in consultation by Trista Connolly for evaluation of colitis.      CC: rectal bleeding    HPI:  Verito Chicas is a 87 year old female who has a PMHx of Alzheimer dementia, hypertension, dyslipidemia, history of colon cancer, history of breast cancer, as well as history of ischemic colitis, who presented with bloody stools. Patient unable to contribute significantly to history given Alzheimer diagnosis and a majority of history gathered from chart review and from daughter.    Patient has had had intermittent bright red blood per rectum over last 3 days on 3 occasions. She had no rectal or abdominal pain. Does have chronic low back pain. Denies fever, chills, heartburn, loss of appetite, nausea or vomiting. Has had no shortness of breath, chest pain, weakness or increase in fatigue. Had not been religiously taking metamucil or miralax lately. Did have regular stools without constipation in last few months.    Last colonoscopy 4/2019 noted multiple small polyps in cecum, no resection, mucosal ulceration- consistent with ischemic colitis. Recommended repeat colonoscopy in 6 months for resection of polyps. Prior to this had a colonoscopy in 06/2019 with pathology noting invasive adenocarcinoma with high grade dysplasia.    VSS. Afebrile BP normotensive. Hemoglobin 11.0->9.7->10.5->10.0. Platelets 215k,     CT notes:  No evidence for active bleeding in the colon. Circumferential wall thickening of the descending colon with mild pericolonic fat stranding. Findings may be secondary to ischemia or colitis. The proximal mesenteric vessels are patent.     Cystic lesion in the right adnexa with  adjacent calcification. Recommend nonemergent pelvic ultrasound for further characterization.    ROS: A comprehensive ten point review of systems was negative aside from those in mentioned in the HPI.      PAST MED HX:  I have reviewed this patient's medical history and updated it with pertinent information if needed.   Past Medical History:   Diagnosis Date     Alzheimer disease (H)      Cancer (H)     breast cancer     GERD (gastroesophageal reflux disease)      Hypertension        MEDICATIONS:   Prior to Admission Medications   Prescriptions Last Dose Informant Patient Reported? Taking?   DULoxetine (CYMBALTA) 30 MG capsule 7/7/2021 at AM Daughter Yes Yes   Sig: Take 30 mg by mouth every morning    amLODIPine (NORVASC) 5 MG tablet 7/7/2021 at AM Daughter Yes Yes   Sig: Take 5 mg by mouth daily    carboxymethylcellulose PF (REFRESH PLUS) 0.5 % ophthalmic solution  at PRN Daughter Yes Yes   Sig: Place 1 drop into both eyes 4 times daily as needed for dry eyes   cetirizine (ZYRTEC) 10 MG tablet 7/6/2021 at PM Daughter Yes Yes   Sig: Take 10 mg by mouth every evening   donepezil (ARICEPT) 10 MG tablet 7/6/2021 at PM Daughter Yes Yes   Sig: Take 10 mg by mouth At Bedtime   fluticasone (FLONASE) 50 MCG/ACT nasal spray 7/7/2021 at AM Daughter Yes Yes   Sig: Spray 1 spray into both nostrils every morning   fluticasone-vilanterol (BREO ELLIPTA) 200-25 MCG/INH inhaler 7/7/2021 at AM Daughter Yes Yes   Sig: Inhale 1 puff into the lungs every morning   hydrochlorothiazide (HYDRODIURIL) 25 MG tablet 7/7/2021 at AM Daughter Yes Yes   Sig: Take 25 mg by mouth every morning    ibuprofen (ADVIL/MOTRIN) 200 MG tablet Past Week at PRN Daughter Yes Yes   Sig: Take 800 mg by mouth every 6 hours as needed for mild pain   labetalol (NORMODYNE) 200 MG tablet 7/7/2021 at AM Daughter Yes Yes   Sig: Take 400 mg by mouth 2 times daily 2 x 200 mg    losartan (COZAAR) 100 MG tablet 7/6/2021 at PM Daughter Yes Yes   Sig: Take 100 mg by mouth  every evening   omeprazole (PRILOSEC) 40 MG DR capsule 7/7/2021 at AM Daughter Yes Yes   Sig: Take 40 mg by mouth every morning    simvastatin (ZOCOR) 40 MG tablet 7/6/2021 at PM Daughter Yes Yes   Sig: Take 20 mg by mouth At Bedtime 1/2 x 40 mg tab      Facility-Administered Medications: None       ALLERGIES: No Known Allergies    SOCIAL HISTORY:  Social History     Tobacco Use     Smoking status: Light Tobacco Smoker     Packs/day: 0.25     Types: Cigarettes     Smokeless tobacco: Never Used   Substance Use Topics     Alcohol use: No     Drug use: No       FAMILY HISTORY:  Family History   Problem Relation Age of Onset     Colon Cancer Brother        PHYSICAL EXAM:   General  Alert and comfortable  Vital Signs with Ranges  Temp: 97.7  F (36.5  C) Temp src: Oral BP: (!) 145/69 Pulse: 62   Resp: 16 SpO2: 91 % O2 Device: None (Room air)    I/O last 3 completed shifts:  In: 921.25 [P.O.:120; I.V.:801.25]  Out: 200 [Urine:200]    Constitutional: healthy, alert, no distress and cooperative   Cardiovascular: negative, PMI normal. No lifts, heaves, or thrills. RRR. No murmurs, clicks gallops or rub  Respiratory: negative, Percussion normal. Good diaphragmatic excursion. Lungs clear  Abdomen: Abdomen soft, non-tender. BS normal. No masses, organomegaly          ADDITIONAL COMMENTS:   I reviewed the patient's new clinical lab test results.   Recent Labs   Lab Test 07/08/21 0527 07/07/21  2356 07/07/21  1856 04/07/20  1634 10/14/19  0624 10/13/19  1400 10/13/19  1400   WBC 14.3*  --   --  21.5* 14.3*  --  17.0*   HGB 10.0* 10.5* 9.7* 12.0 11.1*   < > 11.8   MCV 93  --   --  89 88  --  88     --   --  343 242  --  259   INR  --   --  1.06  --   --   --  1.04    < > = values in this interval not displayed.     Recent Labs   Lab Test 07/08/21  0527 07/07/21  1856 04/07/20  1634   POTASSIUM 3.5 2.9* 3.4   CHLORIDE 105 105 101   CO2 23 25 24   BUN 13 16 13   ANIONGAP 7 7 7     Recent Labs   Lab Test 07/08/21  0237  "07/07/21  1856 04/07/20  1634 10/13/19  1400 02/03/19  1000 02/03/19  0900   ALBUMIN  --  3.0*  --  3.4  --  3.5   BILITOTAL  --  0.5  --  0.6  --  0.4   ALT  --  16  --  19  --  20   AST  --  10  --  18  --  11   PROTEIN 50*  --  10*  --  Negative  --        I reviewed the patient's new imaging results.        CONSULTATION ASSESSMENT AND PLAN:    Verito \"Mercedes\" Juan Francisco is a pleasatn 87 year old female with Alzheimer's dementia, h/o ischemic colitis whom present with rectal bleeding and findings on CT suggestive of colitis.  GI consulted 07/08/21.    Active Problems:    GIB (gastrointestinal bleeding)    Colitis    H/o ischemic colitis     H/o adenocarcinoma of the colon  Hemoglobin 11.0->9.7->10.5->10.0 and stable.   CT suggests ischemic colitis and consistent with prior findings on last colonoscopy  Discussed with family that she does have h/o prior colon adenocarcinoma and ischemic colotis. Cause of recurrent bleeding likely is ischemic colitis but also could have additional neoplastic cause vs hemorrhoidal.  Given hemoglobin stable, patients age and co morbidities it is reasonable to treat conservatively. Alternatively a colonoscopy could be done for diagnosis. Daughter wishes to avoid further procedures.    -- Recommend a regular diet  -- q12 hour hemoglobin  -- Avoid constipation and take twice daily metamucil and daily miralax        [facility].  Office: [phone]  Cell : [phone] (Dr. Randall)  Cell: [phone] (Lori Vazquez PA-C)      "

## 2021-07-08 NOTE — PROGRESS NOTES
MD Notification    Notified Person: MD    Notified Person Name: Iesha    Notification Date/Time: 7/7 @ 1945    Notification Interaction: Page    Purpose of Notification: K+= 2.9    Orders Received: K+ replacement protocol    Comments:

## 2021-07-09 VITALS
TEMPERATURE: 97.6 F | HEART RATE: 63 BPM | BODY MASS INDEX: 21.42 KG/M2 | RESPIRATION RATE: 16 BRPM | DIASTOLIC BLOOD PRESSURE: 74 MMHG | WEIGHT: 116.4 LBS | OXYGEN SATURATION: 92 % | SYSTOLIC BLOOD PRESSURE: 149 MMHG | HEIGHT: 62 IN

## 2021-07-09 LAB
ANION GAP SERPL CALCULATED.3IONS-SCNC: 6 MMOL/L (ref 3–14)
BACTERIA SPEC CULT: NO GROWTH
BASOPHILS # BLD AUTO: 0 10E9/L (ref 0–0.2)
BASOPHILS NFR BLD AUTO: 0.3 %
BUN SERPL-MCNC: 8 MG/DL (ref 7–30)
CALCIUM SERPL-MCNC: 9.9 MG/DL (ref 8.5–10.1)
CHLORIDE SERPL-SCNC: 104 MMOL/L (ref 94–109)
CO2 SERPL-SCNC: 27 MMOL/L (ref 20–32)
CREAT SERPL-MCNC: 0.71 MG/DL (ref 0.52–1.04)
DIFFERENTIAL METHOD BLD: ABNORMAL
EOSINOPHIL # BLD AUTO: 0.1 10E9/L (ref 0–0.7)
EOSINOPHIL NFR BLD AUTO: 0.5 %
ERYTHROCYTE [DISTWIDTH] IN BLOOD BY AUTOMATED COUNT: 12.8 % (ref 10–15)
GFR SERPL CREATININE-BSD FRML MDRD: 76 ML/MIN/{1.73_M2}
GLUCOSE SERPL-MCNC: 90 MG/DL (ref 70–99)
HCT VFR BLD AUTO: 31.4 % (ref 35–47)
HGB BLD-MCNC: 10.5 G/DL (ref 11.7–15.7)
IMM GRANULOCYTES # BLD: 0.1 10E9/L (ref 0–0.4)
IMM GRANULOCYTES NFR BLD: 0.9 %
LYMPHOCYTES # BLD AUTO: 1.9 10E9/L (ref 0.8–5.3)
LYMPHOCYTES NFR BLD AUTO: 16.1 %
Lab: NORMAL
MCH RBC QN AUTO: 30.4 PG (ref 26.5–33)
MCHC RBC AUTO-ENTMCNC: 33.4 G/DL (ref 31.5–36.5)
MCV RBC AUTO: 91 FL (ref 78–100)
MONOCYTES # BLD AUTO: 1.3 10E9/L (ref 0–1.3)
MONOCYTES NFR BLD AUTO: 10.5 %
NEUTROPHILS # BLD AUTO: 8.5 10E9/L (ref 1.6–8.3)
NEUTROPHILS NFR BLD AUTO: 71.7 %
NRBC # BLD AUTO: 0 10*3/UL
NRBC BLD AUTO-RTO: 0 /100
PLATELET # BLD AUTO: 303 10E9/L (ref 150–450)
POTASSIUM SERPL-SCNC: 3.3 MMOL/L (ref 3.4–5.3)
RBC # BLD AUTO: 3.45 10E12/L (ref 3.8–5.2)
SODIUM SERPL-SCNC: 137 MMOL/L (ref 133–144)
SPECIMEN SOURCE: NORMAL
WBC # BLD AUTO: 11.9 10E9/L (ref 4–11)

## 2021-07-09 PROCEDURE — 85025 COMPLETE CBC W/AUTO DIFF WBC: CPT | Performed by: PHYSICIAN ASSISTANT

## 2021-07-09 PROCEDURE — 250N000013 HC RX MED GY IP 250 OP 250 PS 637: Performed by: INTERNAL MEDICINE

## 2021-07-09 PROCEDURE — 250N000013 HC RX MED GY IP 250 OP 250 PS 637: Performed by: NURSE PRACTITIONER

## 2021-07-09 PROCEDURE — G0378 HOSPITAL OBSERVATION PER HR: HCPCS

## 2021-07-09 PROCEDURE — 80048 BASIC METABOLIC PNL TOTAL CA: CPT | Performed by: PHYSICIAN ASSISTANT

## 2021-07-09 PROCEDURE — 250N000013 HC RX MED GY IP 250 OP 250 PS 637: Performed by: PHYSICIAN ASSISTANT

## 2021-07-09 PROCEDURE — 36415 COLL VENOUS BLD VENIPUNCTURE: CPT | Performed by: PHYSICIAN ASSISTANT

## 2021-07-09 PROCEDURE — 99217 PR OBSERVATION CARE DISCHARGE: CPT | Performed by: PHYSICIAN ASSISTANT

## 2021-07-09 RX ORDER — POTASSIUM CHLORIDE 1500 MG/1
20 TABLET, EXTENDED RELEASE ORAL ONCE
Status: COMPLETED | OUTPATIENT
Start: 2021-07-09 | End: 2021-07-09

## 2021-07-09 RX ORDER — POLYETHYLENE GLYCOL 3350 17 G/17G
1 POWDER, FOR SOLUTION ORAL DAILY
COMMUNITY
Start: 2021-07-09

## 2021-07-09 RX ADMIN — DULOXETINE HYDROCHLORIDE 30 MG: 30 CAPSULE, DELAYED RELEASE ORAL at 08:55

## 2021-07-09 RX ADMIN — ACETAMINOPHEN 650 MG: 325 TABLET, FILM COATED ORAL at 08:54

## 2021-07-09 RX ADMIN — POTASSIUM CHLORIDE 20 MEQ: 1500 TABLET, EXTENDED RELEASE ORAL at 08:55

## 2021-07-09 RX ADMIN — POLYETHYLENE GLYCOL 3350 17 G: 17 POWDER, FOR SOLUTION ORAL at 08:55

## 2021-07-09 RX ADMIN — FLUTICASONE PROPIONATE 1 SPRAY: 50 SPRAY, METERED NASAL at 09:01

## 2021-07-09 RX ADMIN — Medication 2 CAPSULE: at 08:54

## 2021-07-09 RX ADMIN — QUETIAPINE 12.5 MG: 25 TABLET, FILM COATED ORAL at 08:55

## 2021-07-09 RX ADMIN — FLUTICASONE FUROATE AND VILANTEROL TRIFENATATE 1 PUFF: 200; 25 POWDER RESPIRATORY (INHALATION) at 09:01

## 2021-07-09 NOTE — PLAN OF CARE
Patient alert to self, was calm and cooperative until an hour ago, become impulsive, agitated, unable to be still,  paged provider for PRN Seroquel, pulled out iv, need to be a sit.

## 2021-07-09 NOTE — DISCHARGE SUMMARY
[facility]    Discharge Summary  Hospitalist    Date of Admission:  7/7/2021  Date of Discharge:  7/9/2021  Discharging Provider: Boni Sequeira PA-C    Discharge Diagnoses   Gastrointestinal hemorrhage, unspecified gastrointestinal hemorrhage type    History of Present Illness   Verito Chicas is an 87 year old female who presented with bright red blood per rectum, presumed GI bleed.    HPI from admission H&P:  Ms. Verito Chicas is an 87-year-old female patient with history including Alzheimer dementia, hypertension, dyslipidemia, history of colon cancer, history of breast cancer, as well as history of ischemic colitis, who presented to outside hospital with bloody stools.  Please note that the patient does have some diagnosis of Alzheimer's dementia and although she does provide some history, much of the history is obtained from reviewing the outside records.  Her daughter is not present now.       Over the past 2-3 days, it was noted that the patient has had some intermittent bright red blood per rectum, numbering about 3 episodes.  Apparently, she has had some right low back pain the last few months.  She was brought to outside [facility] ER for further evaluation.  On initial evaluation there, she was afebrile with heart rate 55, respirations 18, blood pressure 90/43, and O2 saturation 98% on room air.  Laboratory evaluation there showed white count slightly elevated at 13.3, hemoglobin 11.0, platelets 215,000.  BMP is remarkable for creatinine slightly elevated to 1.27 and potassium slightly low at 3.2.  She did have a CT scan, the report of which is not available to me at this time but reportedly showed areas of thickening in the colon.  Given these issues, she was transferred to [facility] for further evaluation.     I saw the patient in her room.  She is awake and alert.  She knows she is in the hospital.  She is aware that she has been having blood per rectum.  Denies any  chest pain.  No shortness of breath.  No cough.  Does note some abdominal cramping.  No fevers or chills.  Does note she has had some trouble sleeping recently.    Hospital Course   Verito Chicas was admitted on 7/7/2021.  The following problems were addressed during her hospitalization:    Bright red blood per rectum with CT imaging revealing possible colitis, suspect ischemic.    Anemia, suspect acute blood loss component.   * Of note, the patient does have history of ischemic colitis in the past in 2019.   * She presented to an outside ER through [facility] with bright red blood per rectum for 2-3 days.  On initial evaluation, she had soft blood pressure with systolic blood pressure in the 90s, otherwise, vitals were stable.  Hemoglobin was 11.0 with normal platelets.  She had a CT scan with circumferential wall thickening of the descending colon with mild pericolonic fat stranding  -Hemoglobin trend 11--9.5  -No further bleeding.  Discussed plan of care with patient and daughter at bedside.  Likely etiology ischemic colitis.  Family would prefer to avoid colonoscopy.  -Tolerated regular diet  -Follow up with GI in 1-2 weeks (Prakash)     Acute kidney injury, resolved  Cr trend 1.27--0.88     Hypokalemia, possibly related to GI losses and diuretic (HCTZ).  Potassium was 3.2 at outside hospital.  - Order potassium replacement protocol.  - Order magnesium level and replace as needed.  - Discontinued hydrochlorothiazide at discharge     Leukocytosis, likely reactive.    The patient had a white count of 13.2 at outside hospital.  She is afebrile.  No clear signs of infection. UA negative. Improved to 11.9 at discharge without intervention, likely reactive.     Alzheimer's dementia.    The patient has some memory impairment.  Does live with her .   - Continue prior to admission donepezil.     Benign essential hypertension.   - Resume PTA amlodipine, labetalol and losartan  - Held hydrochlorothiazide upon  discharge to avoid recurrent dehydration or recurrent ischemic colitis     Gastroesophageal reflux disease.   - PTA omeprazole.     Hyperlipidemia.   - Resume simvastatin at home.     Depression/anxiety.   - Continue duloxetine.    Boni Sequeira PA-C, [facility]    Significant Results and Procedures   As noted    Pending Results   These results will be followed up by n/a  Unresulted Labs Ordered in the Past 30 Days of this Admission     No orders found from 6/7/2021 to 7/8/2021.          Code Status   Full Code       Primary Care Physician   Skylar Douglas    Physical Exam   Temp: 97.6  F (36.4  C) Temp src: Oral BP: (!) 149/74 Pulse: 63   Resp: 16 SpO2: 92 % O2 Device: None (Room air)    Vitals:    07/07/21 1719   Weight: 52.8 kg (116 lb 6.4 oz)     Vital Signs with Ranges  Temp:  [97.5  F (36.4  C)-97.8  F (36.6  C)] 97.6  F (36.4  C)  Pulse:  [62-70] 63  Resp:  [16] 16  BP: (136-157)/(71-75) 149/74  SpO2:  [91 %-95 %] 92 %  I/O last 3 completed shifts:  In: 120 [P.O.:120]  Out: 200 [Urine:200]    GEN: well-developed, well-nourished, appears comfortable  PULM: lungs CTA bilaterally, no increased work of breathing, no wheeze, rales, rhonchi  CV: RRR, S1 & S2, no murmur  GI: soft, nontender, nondistended, no guarding or rigidity, +BS in all 4 quadrants  SKIN: warm & dry without rash, no pedal edema    Discharge Disposition   Discharged to home  Condition at discharge: Stable    Consultations This Hospital Stay   GASTROENTEROLOGY IP CONSULT  GASTROENTEROLOGY IP CONSULT    Time Spent on this Encounter   IBoni PA-C, personally saw the patient today and spent less than or equal to 30 minutes discharging this patient.    Discharge Orders      Reason for your hospital stay    Lower GI bleed, suspected due to ischemic colitis. You were observed with stable blood counts and cleared by GI to return home. You are encouraged to use miralax AND metamucil at home ongoing to prevent constipation and will need to follow-up  with GI at discharge.     Follow-up and recommended labs and tests     Follow up with primary care provider, Skylar Douglas, within 7 days to evaluate medication change and for hospital follow- up.  No follow up labs or test are needed.     Activity    Your activity upon discharge: activity as tolerated     Diet    Follow this diet upon discharge: Orders Placed This Encounter      Regular Diet Adult     Discharge Medications   Current Discharge Medication List      START taking these medications    Details   polyethylene glycol (MIRALAX) 17 GM/Dose powder Take 17 g (1 capful) by mouth daily  Qty:      Associated Diagnoses: Gastrointestinal hemorrhage, unspecified gastrointestinal hemorrhage type      psyllium (METAMUCIL/KONSYL) capsule Take 2 capsules by mouth daily  Qty: 180 capsule, Refills: 3    Comments: Pharmacy to dispense capsule size that is available.  Associated Diagnoses: Gastrointestinal hemorrhage, unspecified gastrointestinal hemorrhage type         CONTINUE these medications which have NOT CHANGED    Details   amLODIPine (NORVASC) 5 MG tablet Take 5 mg by mouth daily       carboxymethylcellulose PF (REFRESH PLUS) 0.5 % ophthalmic solution Place 1 drop into both eyes 4 times daily as needed for dry eyes      cetirizine (ZYRTEC) 10 MG tablet Take 10 mg by mouth every evening      donepezil (ARICEPT) 10 MG tablet Take 10 mg by mouth At Bedtime      DULoxetine (CYMBALTA) 30 MG capsule Take 30 mg by mouth every morning       fluticasone (FLONASE) 50 MCG/ACT nasal spray Spray 1 spray into both nostrils every morning      fluticasone-vilanterol (BREO ELLIPTA) 200-25 MCG/INH inhaler Inhale 1 puff into the lungs every morning      ibuprofen (ADVIL/MOTRIN) 200 MG tablet Take 800 mg by mouth every 6 hours as needed for mild pain      labetalol (NORMODYNE) 200 MG tablet Take 400 mg by mouth 2 times daily 2 x 200 mg       losartan (COZAAR) 100 MG tablet Take 100 mg by mouth every evening      omeprazole  (PRILOSEC) 40 MG DR capsule Take 40 mg by mouth every morning       simvastatin (ZOCOR) 40 MG tablet Take 20 mg by mouth At Bedtime 1/2 x 40 mg tab         STOP taking these medications       hydrochlorothiazide (HYDRODIURIL) 25 MG tablet Comments:   Reason for Stopping:             Allergies   No Known Allergies  Data   Most Recent 3 CBC's:  Recent Labs   Lab Test 07/09/21  0647 07/08/21  1139 07/08/21  0527 04/07/20  1634 04/07/20  1634   WBC 11.9*  --  14.3*  --  21.5*   HGB 10.5* 9.5* 10.0*   < > 12.0   MCV 91  --  93  --  89     --  302  --  343    < > = values in this interval not displayed.      Most Recent 3 BMP's:  Recent Labs   Lab Test 07/09/21  0647 07/08/21  0527 07/07/21  1856    135 137   POTASSIUM 3.3* 3.5 2.9*   CHLORIDE 104 105 105   CO2 27 23 25   BUN 8 13 16   CR 0.71 0.88 1.03   ANIONGAP 6 7 7   WILTON 9.9 10.0 9.4   GLC 90 101* 113*     Most Recent 2 LFT's:  Recent Labs   Lab Test 07/07/21  1856 10/13/19  1400   AST 10 18   ALT 16 19   ALKPHOS 59 76   BILITOTAL 0.5 0.6     Most Recent INR's and Anticoagulation Dosing History:  Anticoagulation Dose History     Recent Dosing and Labs Latest Ref Rng & Units 10/13/2019 7/7/2021    INR 0.86 - 1.14 1.04 1.06        Most Recent 3 Troponin's:No lab results found.  Most Recent Cholesterol Panel:No lab results found.  Most Recent 6 Bacteria Isolates From Any Culture (See EPIC Reports for Culture Details):  Recent Labs   Lab Test 07/08/21  0230 04/07/20  1648 04/07/20  1634 08/20/15  2013   CULT No growth No growth No growth  No growth No anaerobes isolated  Light growth Streptococcus intermedius*     Most Recent TSH, T4 and A1c Labs:No lab results found.  Results for orders placed or performed during the hospital encounter of 04/07/20   XR Chest Port 1 View    Narrative    CHEST ONE VIEW PORTABLE   4/7/2020 4:59 PM     HISTORY: Cough    COMPARISON: 2/3/2019.      Impression    IMPRESSION: No acute cardiopulmonary disease.    ROSY CHAIREZ MD    CT Chest (PE) Abdomen Pelvis w Contrast    Narrative    CT CHEST PULMONARY EMBOLISM ABDOMEN AND PELVIS WITH CONTRAST 4/7/2020  6:13 PM    CLINICAL HISTORY: Shortness of breath. Cough, diarrhea, on  antibiotics, white blood cell count elevated.    TECHNIQUE: CT angiogram chest and routine CT abdomen pelvis with IV  contrast. Arterial phase through the chest and venous phase through  the abdomen and pelvis. 2D and 3D MIP reconstructions were preformed  by the CT technologist. Dose reduction techniques were used.     CONTRAST: 58mL [id number]    COMPARISON: CT abdomen and pelvis 10/13/2019.    FINDINGS:  ANGIOGRAM CHEST: Assessment limited by motion artifact. No central  pulmonary embolism is seen. Limited opacification of the thoracic  aorta limits assessment, though no acute abnormality is seen. Thoracic  aortic and diffuse coronary artery calcifications.    LUNGS AND PLEURA: No effusions. No acute airspace disease. Pulmonary  detail limited by motion.     MEDIASTINUM/AXILLAE: Normal.    HEPATOBILIARY: Normal.    PANCREAS: Normal.    SPLEEN: Normal.    ADRENAL GLANDS: Normal.    KIDNEYS/BLADDER: Normal.    BOWEL: Several distal small bowel loops minimally distended with  fluid. Small fluid in the colon as well. No bowel obstruction is seen.  Incidental small bowel to small bowel intussusception at the left  abdomen series 6 image 48. This is approximately 4 cm in length. There  is no associated inflammation or obstruction. No abscess or free air.    LYMPH NODES: Normal.    PELVIC ORGANS: No acute abnormality is seen. Right ovarian cyst again  noted measuring 2.4 cm, stable compared to the prior study.    OTHER: Diffuse vascular calcifications.    MUSCULOSKELETAL: Normal.      Impression    IMPRESSION:  1.  No evidence for pulmonary embolism. Assessment limited by motion  artifact.  2.  A few small bowel loops mildly distended with fluid may relate to  an enteritis.  3.  Incidental note of small bowel to small bowel  intussusception at  the left abdomen without associated obstruction.  4.  Diffuse vascular calcifications including the coronary arteries  and aorta and its branches.  5.  Stable but indeterminant right ovarian cyst.    ROSY CHAIREZ MD

## 2021-07-09 NOTE — PROVIDER NOTIFICATION
MD Notification    Notified Person: MD    Notified Person Name: Blair    Notification Date/Time: 7/8/21    Notification Interaction: Webpaged    Purpose of Notification: Pt pulled out IV. Pt combative and uncooperative. IVF and IV Protonix. Ok to leave IV out?      Orders Received:    Comments:

## 2021-07-09 NOTE — PLAN OF CARE
A/O to self. VSS on RA ex HTN. Denies pain, appears comfortable. Pt combative and restless at the beginning of the shift, scheduled melatonin and Seroquel given. Pt slept most of the night but continued to be uncooperative. IV was pulled out, unable to replace due to pt behavior. Tolerating regular diet. Up SBA with walker to BR. Incontinent at times. Discharge pending.

## 2021-07-09 NOTE — PROGRESS NOTES
"[facility]  Gastroenterology Progress Note     Verito Chicas MRN# [id number]   YOB: 1933 Age: 87 year old          Assessment and Plan:   Verito \"Mercedes\"Juan Francisco is a pleasatn 87 year old female with Alzheimer's dementia, h/o ischemic colitis whom present with rectal bleeding and findings on CT suggestive of colitis.  GI consulted 07/08/21.     Active Problems:    GIB (gastrointestinal bleeding)    Colitis    H/o ischemic colitis     H/o adenocarcinoma of the colon  Hemoglobin stable around 10   CT suggests ischemic colitis and consistent with prior findings on last colonoscopy  Discussed with family that she does have h/o prior colon adenocarcinoma and ischemic colotis. Cause of recurrent bleeding likely is ischemic colitis but also could have additional neoplastic cause vs hemorrhoidal.  Given hemoglobin stable, patients age and co morbidities it is reasonable to treat conservatively. Alternatively, a colonoscopy could be done for diagnosis. Daughter wishes to avoid further procedures.     -- Recommend a regular diet  --Ok with GI to discharge patient and f/u as an outpatient in 1-2 weeks  -- Avoid constipation and take twice daily metamucil and daily miralax               Interval History:   no new complaints, doing well, denies chest pain, denies shortness of breath, denies abdominal pain and doing well; no cp, sob, n/v/d, or abd pain.              Review of Systems:   C: NEGATIVE for fever, chills, change in weight  E/M: NEGATIVE for ear, mouth and throat problems  R: NEGATIVE for significant cough or SOB  CV: NEGATIVE for chest pain, palpitations or peripheral edema             Medications:   I have reviewed this patient's current medications    donepezil  10 mg Oral At Bedtime     DULoxetine  30 mg Oral QAM     fluticasone  1 spray Both Nostrils QAM     fluticasone-vilanterol  1 puff Inhalation QAM     melatonin  3 mg Oral At Bedtime     pantoprazole " (PROTONIX) IV  40 mg Intravenous Q12H     polyethylene glycol  17 g Oral Daily     potassium chloride  20 mEq Oral Once     psyllium  2 capsule Oral Daily     QUEtiapine  25 mg Oral At Bedtime     sodium chloride (PF)  3 mL Intracatheter Q8H                  Physical Exam:   Vitals were reviewed  Vital Signs with Ranges  Temp:  [97.5  F (36.4  C)-97.8  F (36.6  C)] 97.6  F (36.4  C)  Pulse:  [62-70] 70  Resp:  [16] 16  BP: (136-157)/(71-75) 155/75  SpO2:  [91 %-95 %] 91 %  I/O last 3 completed shifts:  In: 120 [P.O.:120]  Out: 200 [Urine:200]  Constitutional: healthy, alert and no distress   Cardiovascular: negative, PMI normal. No lifts, heaves, or thrills. RRR. No murmurs, clicks gallops or rub  Respiratory: negative, Percussion normal. Good diaphragmatic excursion. Lungs clear  Abdomen: Abdomen soft, non-tender. BS normal. No masses, organomegaly           Data:   I reviewed the patient's new clinical lab test results.   Recent Labs   Lab Test 07/09/21  0647 07/08/21  1139 07/08/21  0527 07/07/21 1856 07/07/21  1856 04/07/20  1634 10/13/19  1400 10/13/19  1400   WBC 11.9*  --  14.3*  --   --  21.5*   < > 17.0*   HGB 10.5* 9.5* 10.0*   < > 9.7* 12.0   < > 11.8   MCV 91  --  93  --   --  89   < > 88     --  302  --   --  343   < > 259   INR  --   --   --   --  1.06  --   --  1.04    < > = values in this interval not displayed.     Recent Labs   Lab Test 07/09/21  0647 07/08/21 0527 07/07/21 1856   POTASSIUM 3.3* 3.5 2.9*   CHLORIDE 104 105 105   CO2 27 23 25   BUN 8 13 16   ANIONGAP 6 7 7     Recent Labs   Lab Test 07/08/21  0230 07/07/21  1856 04/07/20  1634 10/13/19  1400 02/03/19  1000 02/03/19  0900   ALBUMIN  --  3.0*  --  3.4  --  3.5   BILITOTAL  --  0.5  --  0.6  --  0.4   ALT  --  16  --  19  --  20   AST  --  10  --  18  --  11   PROTEIN 50*  --  10*  --  Negative  --        I reviewed the patient's new imaging results.    All laboratory data reviewed  All imaging studies reviewed by rita Sandoval  SARA Vazquez PA-C,  7/9/2021  [facility]  Office : [phone]  Cell: [phone] (Dr. Randall)  Cell: [phone] (Lori Vazquez PA-C)

## 2021-07-19 ENCOUNTER — HOSPITAL ENCOUNTER (INPATIENT)
Facility: CLINIC | Age: 86
LOS: 2 days | Discharge: HOME | DRG: 552 | End: 2021-07-21
Attending: PHYSICIAN ASSISTANT | Admitting: INTERNAL MEDICINE
Payer: COMMERCIAL

## 2021-07-19 ENCOUNTER — APPOINTMENT (OUTPATIENT)
Dept: MRI IMAGING | Facility: CLINIC | Age: 86
DRG: 552 | End: 2021-07-19
Attending: INTERNAL MEDICINE
Payer: COMMERCIAL

## 2021-07-19 ENCOUNTER — APPOINTMENT (OUTPATIENT)
Dept: CT IMAGING | Facility: CLINIC | Age: 86
DRG: 552 | End: 2021-07-19
Attending: PHYSICIAN ASSISTANT
Payer: COMMERCIAL

## 2021-07-19 DIAGNOSIS — S32.020A COMPRESSION FRACTURE OF L2 VERTEBRA, INITIAL ENCOUNTER (H): ICD-10-CM

## 2021-07-19 DIAGNOSIS — S32.030A COMPRESSION FRACTURE OF L3 LUMBAR VERTEBRA, CLOSED, INITIAL ENCOUNTER (H): ICD-10-CM

## 2021-07-19 DIAGNOSIS — I10 BENIGN ESSENTIAL HYPERTENSION: ICD-10-CM

## 2021-07-19 DIAGNOSIS — R93.0 ABNORMAL HEAD CT: ICD-10-CM
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
## 2021-07-19 DIAGNOSIS — S32.010A COMPRESSION FRACTURE OF L1 VERTEBRA, INITIAL ENCOUNTER (H): ICD-10-CM

## 2021-07-19 LAB
ANION GAP SERPL CALCULATED.3IONS-SCNC: 5 MMOL/L (ref 3–14)
BASOPHILS # BLD AUTO: 0 10E3/UL (ref 0–0.2)
BASOPHILS NFR BLD AUTO: 0 %
BUN SERPL-MCNC: 9 MG/DL (ref 7–30)
CALCIUM SERPL-MCNC: 10.3 MG/DL (ref 8.5–10.1)
CHLORIDE BLD-SCNC: 106 MMOL/L (ref 94–109)
CO2 SERPL-SCNC: 29 MMOL/L (ref 20–32)
CREAT SERPL-MCNC: 0.84 MG/DL (ref 0.52–1.04)
EOSINOPHIL # BLD AUTO: 0.1 10E3/UL (ref 0–0.7)
EOSINOPHIL NFR BLD AUTO: 1 %
ERYTHROCYTE [DISTWIDTH] IN BLOOD BY AUTOMATED COUNT: 13 % (ref 10–15)
GFR SERPL CREATININE-BSD FRML MDRD: 63 ML/MIN/1.73M2
GLUCOSE BLD-MCNC: 87 MG/DL (ref 70–99)
HCT VFR BLD AUTO: 30.5 % (ref 35–47)
HGB BLD-MCNC: 10.1 G/DL (ref 11.7–15.7)
IMM GRANULOCYTES # BLD: 0.1 10E3/UL
IMM GRANULOCYTES NFR BLD: 1 %
LYMPHOCYTES # BLD AUTO: 1.9 10E3/UL (ref 0.8–5.3)
LYMPHOCYTES NFR BLD AUTO: 18 %
MCH RBC QN AUTO: 30.5 PG (ref 26.5–33)
MCHC RBC AUTO-ENTMCNC: 33.1 G/DL (ref 31.5–36.5)
MCV RBC AUTO: 92 FL (ref 78–100)
MONOCYTES # BLD AUTO: 0.9 10E3/UL (ref 0–1.3)
MONOCYTES NFR BLD AUTO: 9 %
NEUTROPHILS # BLD AUTO: 7.3 10E3/UL (ref 1.6–8.3)
NEUTROPHILS NFR BLD AUTO: 71 %
NRBC # BLD AUTO: 0 10E3/UL
NRBC BLD AUTO-RTO: 0 /100
PLATELET # BLD AUTO: 320 10E3/UL (ref 150–450)
POTASSIUM BLD-SCNC: 3.7 MMOL/L (ref 3.4–5.3)
RBC # BLD AUTO: 3.31 10E6/UL (ref 3.8–5.2)
SARS-COV-2 RNA RESP QL NAA+PROBE: NEGATIVE
SODIUM SERPL-SCNC: 140 MMOL/L (ref 133–144)
WBC # BLD AUTO: 10.4 10E3/UL (ref 4–11)

## 2021-07-19 PROCEDURE — 80048 BASIC METABOLIC PNL TOTAL CA: CPT | Performed by: PHYSICIAN ASSISTANT

## 2021-07-19 PROCEDURE — 36415 COLL VENOUS BLD VENIPUNCTURE: CPT | Performed by: PHYSICIAN ASSISTANT

## 2021-07-19 PROCEDURE — 85004 AUTOMATED DIFF WBC COUNT: CPT | Performed by: PHYSICIAN ASSISTANT

## 2021-07-19 PROCEDURE — 250N000013 HC RX MED GY IP 250 OP 250 PS 637: Performed by: PHYSICIAN ASSISTANT

## 2021-07-19 PROCEDURE — 70450 CT HEAD/BRAIN W/O DYE: CPT

## 2021-07-19 PROCEDURE — 99223 1ST HOSP IP/OBS HIGH 75: CPT | Mod: AI | Performed by: INTERNAL MEDICINE

## 2021-07-19 PROCEDURE — 250N000013 HC RX MED GY IP 250 OP 250 PS 637: Performed by: INTERNAL MEDICINE

## 2021-07-19 PROCEDURE — 87635 SARS-COV-2 COVID-19 AMP PRB: CPT | Performed by: PHYSICIAN ASSISTANT

## 2021-07-19 PROCEDURE — 255N000002 HC RX 255 OP 636: Performed by: INTERNAL MEDICINE

## 2021-07-19 PROCEDURE — C9803 HOPD COVID-19 SPEC COLLECT: HCPCS

## 2021-07-19 PROCEDURE — 99285 EMERGENCY DEPT VISIT HI MDM: CPT | Mod: 25

## 2021-07-19 PROCEDURE — 120N000001 HC R&B MED SURG/OB

## 2021-07-19 PROCEDURE — 36592 COLLECT BLOOD FROM PICC: CPT | Performed by: PHYSICIAN ASSISTANT

## 2021-07-19 PROCEDURE — 72192 CT PELVIS W/O DYE: CPT

## 2021-07-19 PROCEDURE — 70551 MRI BRAIN STEM W/O DYE: CPT

## 2021-07-19 PROCEDURE — 72131 CT LUMBAR SPINE W/O DYE: CPT

## 2021-07-19 PROCEDURE — A9585 GADOBUTROL INJECTION: HCPCS | Performed by: INTERNAL MEDICINE

## 2021-07-19 RX ORDER — LOSARTAN POTASSIUM 100 MG/1
100 TABLET ORAL EVERY EVENING
Status: DISCONTINUED | OUTPATIENT
Start: 2021-07-19 | End: 2021-07-21 | Stop reason: HOSPADM

## 2021-07-19 RX ORDER — PROCHLORPERAZINE MALEATE 5 MG
5 TABLET ORAL EVERY 6 HOURS PRN
Status: DISCONTINUED | OUTPATIENT
Start: 2021-07-19 | End: 2021-07-21 | Stop reason: HOSPADM

## 2021-07-19 RX ORDER — AMOXICILLIN 250 MG
2 CAPSULE ORAL 2 TIMES DAILY PRN
Status: DISCONTINUED | OUTPATIENT
Start: 2021-07-19 | End: 2021-07-21 | Stop reason: HOSPADM

## 2021-07-19 RX ORDER — CETIRIZINE HYDROCHLORIDE 10 MG/1
10 TABLET ORAL EVERY EVENING
Status: DISCONTINUED | OUTPATIENT
Start: 2021-07-19 | End: 2021-07-21 | Stop reason: HOSPADM

## 2021-07-19 RX ORDER — DONEPEZIL HYDROCHLORIDE 10 MG/1
10 TABLET, FILM COATED ORAL AT BEDTIME
Status: DISCONTINUED | OUTPATIENT
Start: 2021-07-19 | End: 2021-07-21 | Stop reason: HOSPADM

## 2021-07-19 RX ORDER — ACETAMINOPHEN 325 MG/1
650 TABLET ORAL ONCE
Status: COMPLETED | OUTPATIENT
Start: 2021-07-19 | End: 2021-07-19

## 2021-07-19 RX ORDER — CARBOXYMETHYLCELLULOSE SODIUM 5 MG/ML
1 SOLUTION/ DROPS OPHTHALMIC 4 TIMES DAILY PRN
Status: DISCONTINUED | OUTPATIENT
Start: 2021-07-19 | End: 2021-07-21 | Stop reason: HOSPADM

## 2021-07-19 RX ORDER — PROCHLORPERAZINE 25 MG
12.5 SUPPOSITORY, RECTAL RECTAL EVERY 12 HOURS PRN
Status: DISCONTINUED | OUTPATIENT
Start: 2021-07-19 | End: 2021-07-21 | Stop reason: HOSPADM

## 2021-07-19 RX ORDER — LIDOCAINE 40 MG/G
CREAM TOPICAL
Status: DISCONTINUED | OUTPATIENT
Start: 2021-07-19 | End: 2021-07-21 | Stop reason: HOSPADM

## 2021-07-19 RX ORDER — POLYETHYLENE GLYCOL 3350 17 G/17G
17 POWDER, FOR SOLUTION ORAL DAILY PRN
Status: DISCONTINUED | OUTPATIENT
Start: 2021-07-19 | End: 2021-07-21 | Stop reason: HOSPADM

## 2021-07-19 RX ORDER — AMOXICILLIN 250 MG
1 CAPSULE ORAL 2 TIMES DAILY PRN
Status: DISCONTINUED | OUTPATIENT
Start: 2021-07-19 | End: 2021-07-21 | Stop reason: HOSPADM

## 2021-07-19 RX ORDER — FLUTICASONE PROPIONATE 50 MCG
1 SPRAY, SUSPENSION (ML) NASAL EVERY MORNING
Status: DISCONTINUED | OUTPATIENT
Start: 2021-07-20 | End: 2021-07-21 | Stop reason: HOSPADM

## 2021-07-19 RX ORDER — DULOXETIN HYDROCHLORIDE 30 MG/1
30 CAPSULE, DELAYED RELEASE ORAL EVERY MORNING
Status: DISCONTINUED | OUTPATIENT
Start: 2021-07-20 | End: 2021-07-21 | Stop reason: HOSPADM

## 2021-07-19 RX ORDER — LORAZEPAM 2 MG/ML
0.25 INJECTION INTRAMUSCULAR
Status: DISCONTINUED | OUTPATIENT
Start: 2021-07-19 | End: 2021-07-21 | Stop reason: HOSPADM

## 2021-07-19 RX ORDER — HYDRALAZINE HYDROCHLORIDE 20 MG/ML
10 INJECTION INTRAMUSCULAR; INTRAVENOUS EVERY 6 HOURS PRN
Status: DISCONTINUED | OUTPATIENT
Start: 2021-07-19 | End: 2021-07-21 | Stop reason: HOSPADM

## 2021-07-19 RX ORDER — GADOBUTROL 604.72 MG/ML
5 INJECTION INTRAVENOUS ONCE
Status: DISCONTINUED | OUTPATIENT
Start: 2021-07-19 | End: 2021-07-19

## 2021-07-19 RX ORDER — LABETALOL 200 MG/1
400 TABLET, FILM COATED ORAL 2 TIMES DAILY
Status: DISCONTINUED | OUTPATIENT
Start: 2021-07-19 | End: 2021-07-21 | Stop reason: HOSPADM

## 2021-07-19 RX ORDER — ACETAMINOPHEN 325 MG/1
975 TABLET ORAL EVERY 8 HOURS
Status: DISCONTINUED | OUTPATIENT
Start: 2021-07-19 | End: 2021-07-21 | Stop reason: HOSPADM

## 2021-07-19 RX ORDER — ONDANSETRON 4 MG/1
4 TABLET, ORALLY DISINTEGRATING ORAL EVERY 6 HOURS PRN
Status: DISCONTINUED | OUTPATIENT
Start: 2021-07-19 | End: 2021-07-21 | Stop reason: HOSPADM

## 2021-07-19 RX ORDER — AMLODIPINE BESYLATE 5 MG/1
5 TABLET ORAL DAILY
Status: DISCONTINUED | OUTPATIENT
Start: 2021-07-19 | End: 2021-07-20

## 2021-07-19 RX ORDER — ONDANSETRON 2 MG/ML
4 INJECTION INTRAMUSCULAR; INTRAVENOUS EVERY 6 HOURS PRN
Status: DISCONTINUED | OUTPATIENT
Start: 2021-07-19 | End: 2021-07-21 | Stop reason: HOSPADM

## 2021-07-19 RX ORDER — POLYETHYLENE GLYCOL 3350 17 G/17G
17 POWDER, FOR SOLUTION ORAL DAILY
Status: DISCONTINUED | OUTPATIENT
Start: 2021-07-19 | End: 2021-07-21 | Stop reason: HOSPADM

## 2021-07-19 RX ORDER — ACETAMINOPHEN 500 MG
500 TABLET ORAL 2 TIMES DAILY PRN
Status: DISCONTINUED | OUTPATIENT
Start: 2021-07-19 | End: 2021-07-21 | Stop reason: HOSPADM

## 2021-07-19 RX ADMIN — Medication 1 MG: at 21:24

## 2021-07-19 RX ADMIN — ACETAMINOPHEN 975 MG: 325 TABLET, FILM COATED ORAL at 19:46

## 2021-07-19 RX ADMIN — AMLODIPINE BESYLATE 5 MG: 5 TABLET ORAL at 19:46

## 2021-07-19 RX ADMIN — ACETAMINOPHEN 650 MG: 325 TABLET, FILM COATED ORAL at 11:23

## 2021-07-19 RX ADMIN — POLYETHYLENE GLYCOL 3350 17 G: 17 POWDER, FOR SOLUTION ORAL at 21:22

## 2021-07-19 RX ADMIN — LOSARTAN POTASSIUM 100 MG: 100 TABLET, FILM COATED ORAL at 19:46

## 2021-07-19 RX ADMIN — LABETALOL HYDROCHLORIDE 400 MG: 200 TABLET, FILM COATED ORAL at 21:22

## 2021-07-19 RX ADMIN — Medication 1 CAPSULE: at 21:22

## 2021-07-19 RX ADMIN — DONEPEZIL HYDROCHLORIDE 10 MG: 10 TABLET ORAL at 21:22

## 2021-07-19 RX ADMIN — CETIRIZINE HYDROCHLORIDE 10 MG: 10 TABLET, FILM COATED ORAL at 19:46

## 2021-07-19 ASSESSMENT — ENCOUNTER SYMPTOMS: WOUND: 1

## 2021-07-19 ASSESSMENT — ACTIVITIES OF DAILY LIVING (ADL)
ADLS_ACUITY_SCORE: 17
ADLS_ACUITY_SCORE: 17

## 2021-07-19 ASSESSMENT — MIFFLIN-ST. JEOR: SCORE: 996.07

## 2021-07-19 NOTE — PHARMACY-ADMISSION MEDICATION HISTORY
Pharmacy Medication History  Admission medication history interview status for the 7/19/2021  admission is complete. See EPIC admission navigator for prior to admission medications     Location of Interview: Patient room  Medication history sources: Patient's family/friend (daughter)    Significant changes made to the medication list:  Changed psyllium to 1 tablet twice daily (was 2 tablets once daily)    In the past week, patient estimated taking medication this percent of the time: greater than 90%    Additional medication history information:   No other changes have been made to the medication list since patient was discharged on 7/9/21.    Medication reconciliation completed by provider prior to medication history? No    Time spent in this activity: 5 minutes    Prior to Admission medications    Medication Sig Last Dose Taking? Auth Provider   amLODIPine (NORVASC) 5 MG tablet Take 5 mg by mouth daily  7/18/2021 at Unknown time Yes Unknown, Entered By History   carboxymethylcellulose PF (REFRESH PLUS) 0.5 % ophthalmic solution Place 1 drop into both eyes 4 times daily as needed for dry eyes PRN Yes Unknown, Entered By History   cetirizine (ZYRTEC) 10 MG tablet Take 10 mg by mouth every evening 7/18/2021 at Unknown time Yes Unknown, Entered By History   donepezil (ARICEPT) 10 MG tablet Take 10 mg by mouth At Bedtime 7/18/2021 at Unknown time Yes Unknown, Entered By History   DULoxetine (CYMBALTA) 30 MG capsule Take 30 mg by mouth every morning  7/18/2021 at Unknown time Yes Unknown, Entered By History   fluticasone (FLONASE) 50 MCG/ACT nasal spray Spray 1 spray into both nostrils every morning 7/18/2021 at Unknown time Yes Unknown, Entered By History   fluticasone-vilanterol (BREO ELLIPTA) 200-25 MCG/INH inhaler Inhale 1 puff into the lungs every morning 7/18/2021 at Unknown time Yes Unknown, Entered By History   ibuprofen (ADVIL/MOTRIN) 200 MG tablet Take 800 mg by mouth every 6 hours as needed for mild pain PRN  Yes Unknown, Entered By History   labetalol (NORMODYNE) 200 MG tablet Take 400 mg by mouth 2 times daily 2 x 200 mg  7/18/2021 at Unknown time Yes Unknown, Entered By History   losartan (COZAAR) 100 MG tablet Take 100 mg by mouth every evening 7/18/2021 at Unknown time Yes Unknown, Entered By History   omeprazole (PRILOSEC) 40 MG DR capsule Take 40 mg by mouth every morning  7/18/2021 at Unknown time Yes Unknown, Entered By History   polyethylene glycol (MIRALAX) 17 GM/Dose powder Take 17 g (1 capful) by mouth daily 7/18/2021 at Unknown time Yes Boni Sequeira PA-C   psyllium (METAMUCIL/KONSYL) capsule Take 2 capsules by mouth daily  Patient taking differently: Take 1 capsule by mouth 2 times daily  7/18/2021 at Unknown time Yes Boni Sequeira PA-C   simvastatin (ZOCOR) 40 MG tablet Take 20 mg by mouth At Bedtime 1/2 x 40 mg tab 7/18/2021 at Unknown time Yes Unknown, Entered By History       The information provided in this note is only as accurate as the sources available at the time of update(s)

## 2021-07-19 NOTE — ED TRIAGE NOTES
PT lives with  had a fall during night - daughter came to house this morning found pt in bed abrasion to left upper arm - pt complaining of right pain now across pelvic area - painful to stand - EMS called - pt here week ago for colitis

## 2021-07-19 NOTE — H&P
Admitted: 07/19/2021    PCP:  Dr. Skylar Douglas.    CHIEF COMPLAINT:  Fall.    HISTORY OF PRESENT ILLNESS:  Ms. Verito Chicas is an 87-year-old female patient with history noted below including Alzheimer dementia, hypertension, dyslipidemia, history of colon cancer, history of breast cancer, who presented to [facility] today with a fall that was suspected to have occurred last evening.  Please note that given the patient's dementia, the history is obtained entirely mostly from speaking with the patient's daughter at bedside, counseling the ER provider and a review of the electronic medical record.    The patient lives in her home with her .  Daughter says that she had cleaned the usual bedroom and washed it, and the patient was staying in a different room for this reason.  Today, the patient's daughter checked in on her and noted an abrasion on her left and that she was unable to get out of bed due to pain.  She had pain in her back and her pelvis and could not stand up.  Daughter is suspected that she had an unwitnessed fall and this is due to the fact that she had changed her room for the carpet washing.  Given her issues, she was brought to [facility] for further evaluation.    She was seen in the ER.  Vital signs showed temperature 97.3, heart rate 63, blood pressure 97/80, respiratory rate 16, O2 saturation 98% on room air.  She had laboratory evaluation that was fairly unremarkable.  She had a head CT without contrast showed no acute findings.  There was a 13 mm lucent lesion in the left frontal calvarium primarily centered in the right diploic space and demonstrated cortical breakthrough through the overlying outer table with slight breakthrough through the inner table and this therefore suggested a possibility of aggressive features and, as such, MRI was recommended.  CT lumbar spine showed acute 2 column fracture of L1 with approximately 40-50% loss of vertebral body height and 4 mm retropulsion  along the posterior inferior L1 vertebral body contributing to mild spinal canal stenosis; there is also acute compression fracture along the inferior endplate of L2 with approximately 50% loss of vertebral body height centrally and mild retropulsion along the posterior inferior L2 vertebral body contributing to mild spinal canal stenosis; subtle acute fracture along the posterior superior endplate of L3 without accompanying vertebral body height loss and without accompanying retropulsion.  CT of the pelvis showed a subtle fracture of the lower sacrum (new since 04/2020).  Given these findings and her patient's debilitation and pain, request for admission was made.    The patient denies any chest pain.  No shortness of breath.  Denies cough.  Denies abdominal pain, bloody stools, nausea.  No fevers or chills.    PAST MEDICAL HISTORY:     1.  Alzheimer dementia.  She lives with her .  2.  Hypertension.  3.  Dyslipidemia.  4.  GERD.  5.  Depression/anxiety.  6.  History of colon cancer.  She had a colonoscopy with polyp resection in 2019 and pathology subsequently did show some small area of invasive adenocarcinoma.  It is noted that this was completely resected and she did not require any further treatment.  7.  History of breast cancer.  Status post right lumpectomy.  8.  History of ischemic colitis.  The occurred in 2019.  She had another episode in 07/2021.    PAST SURGICAL HISTORY:    Past Surgical History:   Procedure Laterality Date     APPENDECTOMY       BIOPSY      lumpectomy right breast     COLONOSCOPY N/A 1/20/2016    Procedure: COMBINED COLONOSCOPY, SINGLE OR MULTIPLE BIOPSY/POLYPECTOMY BY BIOPSY;  Surgeon: Flynn Pineda MD;  Location:  GI     COLONOSCOPY N/A 10/14/2019    Procedure: COLONOSCOPY, WITH POLYPECTOMY AND BIOPSY;  Surgeon: Rere Leigh MD;  Location:  GI     EXTRACTION(S) DENTAL N/A 8/20/2015    Procedure: EXTRACTION(S) DENTAL;  Surgeon: Fahad Webster DDS;  Location:  [facility] OR     EYE SURGERY      cataract bilat     ORTHOPEDIC SURGERY      arthroscopy knee left     ORTHOPEDIC SURGERY      shoulder surg, fx left     ORTHOPEDIC SURGERY      fx wrist left       ALLERGIES:  NO KNOWN DRUG ALLERGIES.    HOME MEDICATIONS:   Prior to Admission Medications   Prescriptions Last Dose Informant Patient Reported? Taking?   DULoxetine (CYMBALTA) 30 MG capsule 7/18/2021 at Unknown time Daughter Yes Yes   Sig: Take 30 mg by mouth every morning    amLODIPine (NORVASC) 5 MG tablet 7/18/2021 at Unknown time Daughter Yes Yes   Sig: Take 5 mg by mouth daily    carboxymethylcellulose PF (REFRESH PLUS) 0.5 % ophthalmic solution PRN Daughter Yes Yes   Sig: Place 1 drop into both eyes 4 times daily as needed for dry eyes   cetirizine (ZYRTEC) 10 MG tablet 7/18/2021 at Unknown time Daughter Yes Yes   Sig: Take 10 mg by mouth every evening   donepezil (ARICEPT) 10 MG tablet 7/18/2021 at Unknown time Daughter Yes Yes   Sig: Take 10 mg by mouth At Bedtime   fluticasone (FLONASE) 50 MCG/ACT nasal spray 7/18/2021 at Unknown time Daughter Yes Yes   Sig: Spray 1 spray into both nostrils every morning   fluticasone-vilanterol (BREO ELLIPTA) 200-25 MCG/INH inhaler 7/18/2021 at Unknown time Daughter Yes Yes   Sig: Inhale 1 puff into the lungs every morning   ibuprofen (ADVIL/MOTRIN) 200 MG tablet PRN Daughter Yes Yes   Sig: Take 800 mg by mouth every 6 hours as needed for mild pain   labetalol (NORMODYNE) 200 MG tablet 7/18/2021 at Unknown time Daughter Yes Yes   Sig: Take 400 mg by mouth 2 times daily 2 x 200 mg    losartan (COZAAR) 100 MG tablet 7/18/2021 at Unknown time Daughter Yes Yes   Sig: Take 100 mg by mouth every evening   omeprazole (PRILOSEC) 40 MG DR capsule 7/18/2021 at Unknown time Daughter Yes Yes   Sig: Take 40 mg by mouth every morning    polyethylene glycol (MIRALAX) 17 GM/Dose powder 7/18/2021 at Unknown time  No Yes   Sig: Take 17 g (1 capful) by mouth daily   psyllium (METAMUCIL/KONSYL) capsule  7/18/2021 at Unknown time  No Yes   Sig: Take 2 capsules by mouth daily   Patient taking differently: Take 1 capsule by mouth 2 times daily    simvastatin (ZOCOR) 40 MG tablet 7/18/2021 at Unknown time Daughter Yes Yes   Sig: Take 20 mg by mouth At Bedtime 1/2 x 40 mg tab      Facility-Administered Medications: None       SOCIAL HISTORY:  The patient does not drink alcohol.  She smoked a half pack per day for 20 years, but subsequently quit.  She is .  She has 5 children.  She was a homemaker.  She lives with her .  Her daughter does help multiple times a week.    PHYSICAL EXAMINATION:    VITAL SIGNS:  Temperature 97.3, heart rate 66, blood pressure 170/83, respiratory rate 16, O2 saturation 95% on 2 liters.  GENERAL:  This is an 87-year-old female patient lying in bed.  She is alert and oriented.  She is conversant and friendly.  HEENT:  Pupils equally round and reactive.  No scleral icterus or conjunctival injection.  Oropharynx.  No erythema or exudate.  NECK:  No bruits.  No bruits, JVD, adenopathy.  HEART:  Regular rhythm without murmurs, rubs, or gallops.  LUNGS:  Diminished at bases, some pain with deep inspiration.  No crackles or wheeze.  ABDOMEN:  Soft, nontender, nondistended, positive bowel sounds.  No femoral bruits.  EXTREMITIES:  No pitting edema.  NEUROLOGIC:  No gross focal deficits.    LABORATORY AND IMAGING:   Results for orders placed or performed during the hospital encounter of 07/19/21   Head CT w/o contrast     Status: None    Narrative    CT HEAD WITHOUT CONTRAST 7/19/2021 11:21 AM    INDICATION: Head trauma, minor (Age >= 65y).    TECHNIQUE: CT scan of the head without contrast. Dose reduction  techniques were used.  CONTRAST: None.    COMPARISON: None.    FINDINGS:  No skull fracture. No scalp hematoma. No intracranial  hemorrhage, extraaxial collection, mass effect or CT evidence of acute  infarct.  Mild presumed chronic small vessel ischemic changes.  Moderate generalized  volume loss. The ventricles are proportional to  the sulci. Calcification of the distal internal carotid arteries  bilaterally. Lucent lesion in the left parietal calvarium measuring up  to 13 mm in diameter is primarily centered in the diploic space.  However, this demonstrates breakthrough through the outer table and  slight breakthrough through the inner table. This suggests the  possibility of aggressive features, and further evaluation with  contrast-enhanced brain MRI recommended. Postoperative changes to the  bilateral lenses. Paranasal sinuses are free of significant disease.  Clear mastoid air cells.      Impression    IMPRESSION:  1. No acute intracranial abnormality. No skull fracture, no scalp  hematoma, and no intracranial hemorrhage.  2. Moderate diffuse parenchymal volume loss with a mild burden  scattered chronic small vessel ischemic change.  3. 13 mm lucent lesion in the left parietal calvarium primarily  centered in the diploic space. This demonstrates cortical breakthrough  through the overlying outer table, with slight breakthrough through  the inner table. This breakthrough suggests the possibility of  aggressive features, and further evaluation with contrast-enhanced  brain MRI is recommended.    FRANSISCO GONZALEZ MD         SYSTEM ID:  [id number]   Lumbar spine CT w/o contrast     Status: None    Narrative    CT LUMBAR SPINE WITHOUT CONTRAST  7/19/2021 11:23 AM    INDICATION: Low back pain, trauma.    TECHNIQUE: CT scan of the lumbar spine without contrast. Dose  reduction techniques were used.    COMPARISON: 4/7/2020 CT abdomen and pelvis.    FINDINGS: Five nonrib-bearing lumbar-type vertebrae. Acute two-column  compression fracture of the L1 vertebral body with up to 40-50% loss  of vertebral body height centrally and 4 mm retropulsion along the  posterior inferior L1 vertebral body, contributing to a mild spinal  canal stenosis. No significant extension of this fracture to involve  the  posterior elements.    Additional acute compression fracture along the inferior endplate of  L2 with approximately 50% loss of vertebral body height centrally.  Additional 4 mm retropulsion along the posterior inferior L2 vertebral  body contributes to mild spinal canal stenosis. No extension of this  fracture to involve the posterior elements.    Subtle acute fracture along the posterior superior endplate of L3 is  demonstrated on sagittal image 39 and coronal image 23. No significant  accompanying vertebral body height loss. No additional acute lumbar  spine fracture elsewhere. Small amount of prevertebral edema at the L1  and L2 levels.    Unchanged chronic height loss along the inferior endplate of L5 with  1.7 cm anterolisthesis of L5 on S1. This is accompanied by fusion at  the L5-S1 level and a chronic right L5 pars defect. Disc space heights  are otherwise relatively preserved elsewhere, with vacuum disc  phenomenon at L2-L3. Multilevel small posterior disc bulges with mild  spinal canal stenosis at L1-L2 and L2-L3 relating to retropulsion.  Mild to moderate right and mild left L3-L4 facet arthropathy with  moderate bilateral L4-L5 facet arthropathy. Fusion at the bilateral  L5-S1 facets. Moderate to severe right and moderate left L5-S1 neural  foraminal stenosis, with more mild neural foraminal stenosis  elsewhere. Fatty atrophy of the dorsal paraspinal musculature  inferiorly. Scattered vascular calcification. Remainder negative.      Impression    IMPRESSION:  1.  Acute two-column compression fracture of L1 with approximately  40-50% loss of vertebral body height and 4 mm retropulsion along the  posterior inferior L1 vertebral body, contributing to mild spinal  canal stenosis. This is additionally accompanied by a small amount of  prevertebral posttraumatic fat stranding. No extension of this  fracture to involve the posterior elements.  2. Acute compression fracture along the inferior endplate of L2  with  approximately 50% loss of vertebral body height centrally and mild  retropulsion along the posterior inferior L2 vertebral body,  contributing to mild spinal canal stenosis. Small amount of  accompanying prevertebral fat stranding. No extension to involve the  posterior elements.  3. Subtle acute fracture along the posterior superior endplate of L3  without accompanying vertebral body height loss and without  accompanying retropulsion.  4. Unchanged 1.7 cm anterolisthesis of L5 on S1 with unchanged chronic  height loss along the inferior endplate of L5 and redemonstrated  fusion across the L5-S1 facets and disc space.  5. Diffuse osteopenia with degenerative change as above. No high-grade  spinal canal stenosis. Neural foraminal stenosis is as detailed above.    FRANSISCO GONZALEZ MD         SYSTEM ID:  [id number]   CT Pelvis Bone wo Contrast     Status: None (Preliminary result)    Narrative    CT PELVIS BONE WITHOUT CONTRAST  7/19/2021 11:25 AM     HISTORY: Pelvic trauma with pain.    TECHNIQUE:  Axial images with reconstructions. No IV contrast.  Radiation dose for this scan was reduced using automated exposure  control, adjustment of the mA and/or kV according to patient size, or  iterative reconstruction technique.    COMPARISON:  None    FINDINGS:  Mild bilateral sacroiliac arthropathy. Lower lumbar spine  degenerative change. Lumbosacral spondylolisthesis. Fracture of the  left L4 transverse process (assuming five lumbar type vertebrae). At  the ventral aspect of the S4 level, there is mild cortical buckling,  not present on body CT dated 4/7/2020. Therefore this is felt to  represent a focal fracture (age-indeterminate, but new since April 2020). Colonic diverticulosis. Right ovarian/adnexal cystic lesion;  this has not changed significantly since April 2020 and has been  present dating back to July 2006.      Impression    IMPRESSION:  1. Subtle fracture of the lower sacrum (age-indeterminate, but  new  since April 2020).  2. Fracture of the left L4 transverse process.  3. Additional findings discussed above.   Basic metabolic panel     Status: Abnormal   Result Value Ref Range    Sodium 140 133 - 144 mmol/L    Potassium 3.7 3.4 - 5.3 mmol/L    Chloride 106 94 - 109 mmol/L    Carbon Dioxide (CO2) 29 20 - 32 mmol/L    Anion Gap 5 3 - 14 mmol/L    Urea Nitrogen 9 7 - 30 mg/dL    Creatinine 0.84 0.52 - 1.04 mg/dL    Calcium 10.3 (H) 8.5 - 10.1 mg/dL    Glucose 87 70 - 99 mg/dL    GFR Estimate 63 >60 mL/min/1.73m2   CBC with platelets and differential     Status: Abnormal   Result Value Ref Range    WBC Count 10.4 4.0 - 11.0 10e3/uL    RBC Count 3.31 (L) 3.80 - 5.20 10e6/uL    Hemoglobin 10.1 (L) 11.7 - 15.7 g/dL    Hematocrit 30.5 (L) 35.0 - 47.0 %    MCV 92 78 - 100 fL    MCH 30.5 26.5 - 33.0 pg    MCHC 33.1 31.5 - 36.5 g/dL    RDW 13.0 10.0 - 15.0 %    Platelet Count 320 150 - 450 10e3/uL    % Neutrophils 71 %    % Lymphocytes 18 %    % Monocytes 9 %    % Eosinophils 1 %    % Basophils 0 %    % Immature Granulocytes 1 %    NRBCs per 100 WBC 0 <1 /100    Absolute Neutrophils 7.3 1.6 - 8.3 10e3/uL    Absolute Lymphocytes 1.9 0.8 - 5.3 10e3/uL    Absolute Monocytes 0.9 0.0 - 1.3 10e3/uL    Absolute Eosinophils 0.1 0.0 - 0.7 10e3/uL    Absolute Basophils 0.0 0.0 - 0.2 10e3/uL    Absolute Immature Granulocytes 0.1 (H) <=0.0 10e3/uL    Absolute NRBCs 0.0 10e3/uL   Asymptomatic COVID-19 Virus (Coronavirus) by PCR Nasopharyngeal     Status: None ()    Specimen: Nasopharyngeal; Swab    Narrative    The following orders were created for panel order Asymptomatic COVID-19 Virus (Coronavirus) by PCR Nasopharyngeal.  Procedure                               Abnormality         Status                     ---------                               -----------         ------                     SARS-COV2 (COVID-19) Vir...[[id number]]                                                   Please view results for these tests on the  individual orders.   CBC with platelets differential     Status: Abnormal    Narrative    The following orders were created for panel order CBC with platelets differential.  Procedure                               Abnormality         Status                     ---------                               -----------         ------                     CBC with platelets and d...[[id number]]  Abnormal            Final result                 Please view results for these tests on the individual orders.             ASSESSMENT AND PLAN:    Mrs. Arjun Chicas is an 87-year-old female patient with history including Alzheimer's dementia, hypertension, dyslipidemia, GERD, depression/anxiety, who presents after suffering a fall and was found with multiple lumbar spine compression fractures and sacral fracture.    Fall with lumbar compression fractures and sacral fracture.  * The patient does have underlying dementia.  Per daughter, she suspected that the patient had a fall some time last the evening prior to presentation.  On initial evaluation here, subsequently on 07/19/2021 was unable to get out of bed due to pain.  On initial evaluation here, she had a head CT which was negative for acute findings.  She had a CT lumbar spine that showed acute L1 and L2 compression fractures with mild spinal canal stenosis; acute L3 posterior endplate fracture.  CT of the pelvis that showed possible subtle fracture of the lower sacrum, which was age indeterminate, but new since 04/2020.    - At this time, we will order scheduled acetaminophen 3 times a day and also p.r.n.    - We will ask Orthopedic Surgery to see the patient.  - Ask PT, OT and Social Work to see the patient.    - Suspect she will need a TCU.    Abnormal head CT with 13 mm lucent lesion in the left frontal calvarium with signs of cortical breakthrough.  CT head did not show acute findings, but there was 13 mm lucent lesion in the left frontal calvarium with signs of cortical  breakthrough and there was concern about aggressive features; overall, MRI was recommended.   - Discussed this finding with the patient's daughter and she does want further evaluation.   - As such, we will order MRI of the brain with and without contrast.    Mild anemia, suspect chronic component.  Hemoglobin today is 10.1.  Hemoglobin was 10.5 on .  No overt clinical signs of major bleeding.  - Monitor CBC.  - Consider prbc transfusion if hgb </= 7.0 or if significant bleeding with hemodynamic instability or if symptomatic.    Alzheimer dementia.  The patient has memory impairment.  Patient does live with her  in their home.  Daughter helps out multiple times a week.  - Continue prior to admission donepezil.   - Reorient as needed.   - Maintain normal day/night, sleep/wake cycles.  - Minimize sedating medications as able.    Benign essential hypertension.  - Continue prior to admission amlodipine, labetalol and losartan.  - Hold prior to admission hydrochlorothiazide for now.     GERD.  - Continue omeprazole.    Dyslipidemia.  - Continue simvastatin.    Depression/anxiety.  - Continue duloxetine.    PROPHYLAXIS.  - Pneumoboots and ambulation.    CODE STATUS:  As per previous discussion with the patient's daughter, she is FULL CODE.      Angel Julian Jr., MD        D: 2021   T: 2021   MT: PAKMT    Name:     ARANZA DOBSON  MRN:      [id number]        Account:     [id number]   :      1933           Admitted:    2021       Document: [id number]    cc:  Skylar Douglas MD

## 2021-07-19 NOTE — PROGRESS NOTES
Consult received.    Per chart, suspect ground level fall as source of new compression fractures of L1, L2.  Possible mild fractures of L3, lower sacrum as well.  No severe stenosis present, no unstable injuries.    Nonoperative injuries.    - No spinal precautions necessary  - Given age and Alzheimer's, think the risk of a brace (pressure sores) outweigh the benefit - do not recommend bracing.    - Please call if any lower extremity neurologic deficits are demonstrated (none listed in notes per chart), otherwise full consult will follow in 24h.    Ivan Dove MD  Orthopaedic Spine Surgery  [facility]

## 2021-07-19 NOTE — H&P
INTERNAL MEDICINE H&P    H&P dictated:  #[id number]    Angel Julian Jr., MD  [phone] (p)  Text Page  Josh

## 2021-07-19 NOTE — ED NOTES
[facility]  ED Nurse Handoff Report    ED Chief complaint: Fall      ED Diagnosis:   Final diagnoses:   Compression fracture of L1 vertebra, initial encounter (H)   Compression fracture of L2 vertebra, initial encounter (H)   Compression fracture of L3 lumbar vertebra, closed, initial encounter (H)   Fall, initial encounter   Abnormal head CT       Code Status: (needs to be addressed by hospitalist - has been full code and DNR/DNI for past hospitalizations)     Allergies: No Known Allergies    Patient Story: Patient lives at home with spouse.  Daughter came to check on mother this AM and noticed an abrasion to her arm and wasn't able to get out of bed.  Patient has hx of alzheimer's and at baseline mentation according to daughter - alert to self.  Patient given tylenol for lower back pain.       Focused Assessment:  CT shows sacral/lumbar fx and abnormal head CT.  Daughter states patient normally able to walk independently at home.  Pt on bedrest - pure wick placed.  Pt has not tried to get out of bed - calm/cooperative. Placed on 2L NC.  Patient sats 88-89% while lying flat.  Unable to sit up d/t pain.    Treatments and/or interventions provided: tylenol  Patient's response to treatments and/or interventions: diminished pain if no movement     To be done/followed up on inpatient unit:  na    Does this patient have any cognitive concerns?: Alzheimer's    Activity level - Baseline/Home:  Independent  Activity Level - Current:   Unknown    Patient's Preferred language: English   Needed?: No    Isolation: None  Infection: Not Applicable  Patient tested for COVID 19 prior to admission: YES  Bariatric?: No    Vital Signs:   Vitals:    07/19/21 1150 07/19/21 1200 07/19/21 1230 07/19/21 1300   BP:  (!) 193/82 (!) 177/77 (!) 170/83   Pulse:  62 61 66   Resp:       Temp:       TempSrc:       SpO2: 96% 97% 98% 95%   Weight:       Height:           Cardiac Rhythm:     Was the PSS-3 completed:   No -  confused  What interventions are required if any?               Family Comments: daughter @ bedside  OBS brochure/video discussed/provided to patient/family: N/A              Name of person given brochure if not patient:               Relationship to patient:     For the majority of the shift this patient's behavior was Green.   Behavioral interventions performed were .    ED NURSE PHONE NUMBER: *[phone]

## 2021-07-19 NOTE — ED NOTES
Bed: ED24  Expected date:   Expected time:   Means of arrival:   [facility]  [facility] 89 [facility] eta 1037

## 2021-07-19 NOTE — ED PROVIDER NOTES
"  History     Chief Complaint:  Fall      The history is provided by the patient.      Verito Chicas is a 87 year old female with a history of alzheimer's disease and hypertension who presents with a fall. The patient had an unwitnessed fall last night and is unsure how she fell. She notes that she was in a room that was dark. She was able to get back up and walk back to her bed. She woke up the next morning and was unable move. The daughter was at their house and noticed an abrasion on her left and she was unable to get out of bed. She now complains of pelvic pain, back pain, and is unable to stand up. She described the pain as diffuse and is exacerbated with movement. The daughter notes that the patient complaining of weakness, but did have a \"bad spell\" over the past couple of months. She states that the patient's health was improving until the fall. No association of urinary symptoms.       Review of Systems   Genitourinary: Positive for pelvic pain.   Skin: Positive for wound (Abrasion on left arm).   All other systems reviewed and are negative.        Allergies:  No Known Allergies    Medications:    Norvasc   Zyrtec  Aricept   Cymbalta   Ellipta   Normodyne   Losartan   Prilosec     Past Medical History:    Alzheimer disease   Cancer   GERD  HTN  Facial Edema   Depression   Humerus Fracture   Cataracts     Past Surgical History:    Appendectomy   Lumpectomy   Dental Extractions   Cataract Extraction  Shoulder Surgery   Open reduction of left wrist      Family History:    Brother - Colon cancer     Social History:  Patient was accompanied to the ED with daughter.  Hx of tobacco use.  Hx of alcohol use.  Lives at home with .     Physical Exam     Patient Vitals for the past 24 hrs:   BP Temp Temp src Pulse Resp SpO2 Height Weight   07/19/21 1545 (!) 201/71 (!) 96.2  F (35.7  C) Oral 66 16 95 % -- --   07/19/21 1500 -- -- -- -- -- 99 % -- --   07/19/21 1430 -- -- -- -- -- 97 % -- --   07/19/21 " "1400 -- -- -- -- -- 97 % -- --   07/19/21 1330 -- -- -- -- -- 91 % -- --   07/19/21 1300 (!) 170/83 -- -- 66 -- 95 % -- --   07/19/21 1230 (!) 177/77 -- -- 61 -- 98 % -- --   07/19/21 1200 (!) 193/82 -- -- 62 -- 97 % -- --   07/19/21 1150 -- -- -- -- -- 96 % -- --   07/19/21 1149 -- -- -- -- -- 95 % -- --   07/19/21 1147 -- -- -- -- -- (!) 88 % -- --   07/19/21 1146 -- -- -- -- -- (!) 89 % -- --   07/19/21 1145 -- -- -- -- -- (!) 88 % -- --   07/19/21 1140 -- -- -- -- -- 92 % -- --   07/19/21 1130 (!) 194/82 -- -- 61 -- 93 % -- --   07/19/21 1125 (!) 203/77 -- -- 64 -- 93 % -- --   07/19/21 1045 (!) 197/80 97.3  F (36.3  C) Oral 63 16 93 % 1.676 m (5' 6\") 54.4 kg (120 lb)     Physical Exam  General: No distress. Increased back pain with movement.   Head:  Scalp is atraumatic. No raccoon eyes or pedroza sign.  Eyes:  The pupils are equal, round, and reactive to light. Normal conjunctiva.   ENT:                                      Ears:  The external ears are normal.   Nose:  The external nose is normal.  Throat:  The oropharynx is normal. Mucus membranes are moist.                 Neck:  Normal range of motion.   CV:  Normal rate. No murmur. 2+ radial pulses  Resp:  Breath sounds are clear bilaterally. Non-labored, no retractions or accessory muscle use.  GI:  Abdomen is soft, no distension, no tenderness.   MS:  Normal range of motion. No acute deformities.  No midline cervical or thoracic tenderness.  Diffuse lumbar midline tenderness.  Full range of motion of upper and lower extremities including flexion of the hips.    Skin:  Warm and dry. No rash.   Neuro:  Alert. Strength and sensation grossly intact.   Psych:  Awake. Alert.  Appropriate interactions.     Emergency Department Course   Imaging:  CT Pelvis Bone wo Contrast  Preliminary Result  IMPRESSION:  1. Subtle fracture of the lower sacrum (age-indeterminate, but new  since April 2020).  2. Fracture of the left L4 transverse process.  3. Additional findings " discussed above.  Per Radiology     Lumbar spine CT w/o contrast  Preliminary Result  IMPRESSION:  1.  Acute two-column compression fracture of L1 with approximately  40-50% loss of vertebral body height and 4 mm retropulsion along the  posterior inferior L1 vertebral body, contributing to mild spinal  canal stenosis. This is additionally accompanied by a small amount of  prevertebral posttraumatic fat stranding. No extension of this  fracture to involve the posterior elements.  2. Acute compression fracture along the inferior endplate of L2 with  approximately 50% loss of vertebral body height centrally and mild  retropulsion along the posterior inferior L2 vertebral body,  contributing to mild spinal canal stenosis. Small amount of  accompanying prevertebral fat stranding. No extension to involve the  posterior elements.  3. Subtle acute fracture along the posterior superior endplate of L3  without accompanying vertebral body height loss and without  accompanying retropulsion.  4. Unchanged 1.7 cm anterolisthesis of L5 on S1 with unchanged chronic  height loss along the inferior endplate of L5 and redemonstrated  fusion across the L5-S1 facets and disc space.  5. Diffuse osteopenia with degenerative change as above. No high-grade  spinal canal stenosis. Neural foraminal stenosis is as detailed above.  Per Radiology     Head CT w/o contrast  Preliminary Result  IMPRESSION:  1. No acute intracranial abnormality. No skull fracture, no scalp  hematoma, and no intracranial hemorrhage.  2. Moderate diffuse parenchymal volume loss with a mild burden  scattered chronic small vessel ischemic change.  3. 13 mm lucent lesion in the left parietal calvarium primarily  centered in the diploic space. This demonstrates cortical breakthrough  through the overlying outer table, with slight breakthrough through  the inner table. This breakthrough suggests the possibility of  aggressive features, and further evaluation with  contrast-enhanced  brain MRI is recommended.  Per Radiology     Laboratory:  CBC: WBC 10.4, HGB 10.1 (L),     BMP: Calcium - 10.3 o/w WNL (Creatinine 0.84)     Asymptomatic COVID-19 Virus (Coronavirus) by PCR Nasopharyngeal swab: Negative     Emergency Department Course:    Reviewed:  I reviewed nursing notes, vitals, past history and care everywhere    Assessments:  1045 I obtained history and examined the patient as noted above.   1231 I rechecked the patient and explained findings.     Consults:   1247 I consulted with Dr. Julian of hospitalist services.     Interventions:  1123 Tylenol 650mg PO    Disposition:  The patient was admitted to the hospital under the care of Dr. Julian.    Impression & Plan    Medical Decision Making:  Verito Chicas is a 87 year old female presents emergency department after an unwitnessed fall at her home.  Patient reports she got out of bed and melanite became disoriented and fell.  Vitals notable for elevated blood pressure, otherwise within normal limits.  Blood work notable for anemia, similar to past levels.  There is no electrolyte abnormality.  No acute kidney injury.  COVID-19 negative.  There is no chest pain to suggest acute coronary syndrome.  Patient's blood pressure noted to be elevated after admission, she reports did not take her morning medications and I believe this is contributing.  Plan to restart oral medications including blood pressure medications upon admission.  Patient reports diffuse low back pain and CT lumbar spine notes compression fractures of L1 and L2 along with posterior superior endplate fracture of L3.  CT of the pelvis notes subtle fracture of the lower sacrum and fracture of L4 transverse process.  Head CT without evidence of intracranial hemorrhage or other acute findings.  Patient has significant pain with any movement.  Daughter is concerned about her going home and managing pain as she lives alone with her .  Plan for  admission for likely transfer to TCU.  Patient and daughter agree with this plan all questions and concerns addressed prior to admission.    Of note, head CT noted a 30 mm lucent lesion in the left parietal calvarium primarily centered in the pleural space.  Recommended follow-up MRI.    Covid-19  Verito Chicas was evaluated during a global COVID-19 pandemic, which necessitated consideration that the patient might be at risk for infection with the SARS-CoV-2 virus that causes COVID-19.   Applicable protocols for evaluation were followed during the patient's care.   COVID-19 was considered as part of the patient's evaluation. The plan for testing is:  a test was obtained during this visit.    Diagnosis:    ICD-10-CM    1. Compression fracture of L1 vertebra, initial encounter (H)  S32.010A    2. Compression fracture of L2 vertebra, initial encounter (H)  S32.020A    3. Compression fracture of L3 lumbar vertebra, closed, initial encounter (H)  S32.030A    4. Fall, initial encounter  W19.XXXA    5. Abnormal head CT  R93.0        Scribe Disclosure:  I, Nathaniel Harrington, am serving as a scribe at 10:45 AM on 7/19/2021 to document services personally performed by Dinah Knox PA-C based on my observations and the provider's statements to me.      Dinah Knox PA-C  07/19/21 [id number]

## 2021-07-20 ENCOUNTER — APPOINTMENT (OUTPATIENT)
Dept: PHYSICAL THERAPY | Facility: CLINIC | Age: 86
DRG: 552 | End: 2021-07-20
Attending: INTERNAL MEDICINE
Payer: COMMERCIAL

## 2021-07-20 LAB
ANION GAP SERPL CALCULATED.3IONS-SCNC: 7 MMOL/L (ref 3–14)
BASOPHILS # BLD AUTO: 0 10E3/UL (ref 0–0.2)
BASOPHILS NFR BLD AUTO: 0 %
BUN SERPL-MCNC: 12 MG/DL (ref 7–30)
CALCIUM SERPL-MCNC: 10.6 MG/DL (ref 8.5–10.1)
CHLORIDE BLD-SCNC: 100 MMOL/L (ref 94–109)
CO2 SERPL-SCNC: 28 MMOL/L (ref 20–32)
CREAT SERPL-MCNC: 0.99 MG/DL (ref 0.52–1.04)
EOSINOPHIL # BLD AUTO: 0.1 10E3/UL (ref 0–0.7)
EOSINOPHIL NFR BLD AUTO: 1 %
ERYTHROCYTE [DISTWIDTH] IN BLOOD BY AUTOMATED COUNT: 13.1 % (ref 10–15)
GFR SERPL CREATININE-BSD FRML MDRD: 51 ML/MIN/1.73M2
GLUCOSE BLD-MCNC: 96 MG/DL (ref 70–99)
HCT VFR BLD AUTO: 31.8 % (ref 35–47)
HGB BLD-MCNC: 10.5 G/DL (ref 11.7–15.7)
IMM GRANULOCYTES # BLD: 0.1 10E3/UL
IMM GRANULOCYTES NFR BLD: 1 %
LYMPHOCYTES # BLD AUTO: 1.8 10E3/UL (ref 0.8–5.3)
LYMPHOCYTES NFR BLD AUTO: 22 %
MCH RBC QN AUTO: 30.4 PG (ref 26.5–33)
MCHC RBC AUTO-ENTMCNC: 33 G/DL (ref 31.5–36.5)
MCV RBC AUTO: 92 FL (ref 78–100)
MONOCYTES # BLD AUTO: 1 10E3/UL (ref 0–1.3)
MONOCYTES NFR BLD AUTO: 12 %
NEUTROPHILS # BLD AUTO: 5.3 10E3/UL (ref 1.6–8.3)
NEUTROPHILS NFR BLD AUTO: 64 %
NRBC # BLD AUTO: 0 10E3/UL
NRBC BLD AUTO-RTO: 0 /100
PLATELET # BLD AUTO: 322 10E3/UL (ref 150–450)
POTASSIUM BLD-SCNC: 3.7 MMOL/L (ref 3.4–5.3)
RBC # BLD AUTO: 3.45 10E6/UL (ref 3.8–5.2)
SODIUM SERPL-SCNC: 135 MMOL/L (ref 133–144)
WBC # BLD AUTO: 8.2 10E3/UL (ref 4–11)

## 2021-07-20 PROCEDURE — 250N000013 HC RX MED GY IP 250 OP 250 PS 637: Performed by: INTERNAL MEDICINE

## 2021-07-20 PROCEDURE — 99233 SBSQ HOSP IP/OBS HIGH 50: CPT | Performed by: INTERNAL MEDICINE

## 2021-07-20 PROCEDURE — 85025 COMPLETE CBC W/AUTO DIFF WBC: CPT | Performed by: INTERNAL MEDICINE

## 2021-07-20 PROCEDURE — 82374 ASSAY BLOOD CARBON DIOXIDE: CPT | Performed by: INTERNAL MEDICINE

## 2021-07-20 PROCEDURE — 120N000001 HC R&B MED SURG/OB

## 2021-07-20 PROCEDURE — 36415 COLL VENOUS BLD VENIPUNCTURE: CPT | Performed by: INTERNAL MEDICINE

## 2021-07-20 PROCEDURE — 97161 PT EVAL LOW COMPLEX 20 MIN: CPT | Mod: GP | Performed by: PHYSICAL THERAPIST

## 2021-07-20 RX ORDER — AMLODIPINE BESYLATE 5 MG/1
5 TABLET ORAL ONCE
Status: COMPLETED | OUTPATIENT
Start: 2021-07-20 | End: 2021-07-20

## 2021-07-20 RX ORDER — LORAZEPAM 2 MG/ML
0.5 INJECTION INTRAMUSCULAR
Status: DISCONTINUED | OUTPATIENT
Start: 2021-07-20 | End: 2021-07-21 | Stop reason: HOSPADM

## 2021-07-20 RX ORDER — AMLODIPINE BESYLATE 10 MG/1
10 TABLET ORAL DAILY
Status: DISCONTINUED | OUTPATIENT
Start: 2021-07-21 | End: 2021-07-21 | Stop reason: HOSPADM

## 2021-07-20 RX ADMIN — OMEPRAZOLE 40 MG: 20 CAPSULE, DELAYED RELEASE ORAL at 08:10

## 2021-07-20 RX ADMIN — DONEPEZIL HYDROCHLORIDE 10 MG: 10 TABLET ORAL at 21:18

## 2021-07-20 RX ADMIN — Medication 1 MG: at 21:18

## 2021-07-20 RX ADMIN — POLYETHYLENE GLYCOL 3350 17 G: 17 POWDER, FOR SOLUTION ORAL at 08:10

## 2021-07-20 RX ADMIN — ACETAMINOPHEN 975 MG: 325 TABLET, FILM COATED ORAL at 06:46

## 2021-07-20 RX ADMIN — Medication 1 CAPSULE: at 21:18

## 2021-07-20 RX ADMIN — CETIRIZINE HYDROCHLORIDE 10 MG: 10 TABLET, FILM COATED ORAL at 21:18

## 2021-07-20 RX ADMIN — LOSARTAN POTASSIUM 100 MG: 100 TABLET, FILM COATED ORAL at 21:18

## 2021-07-20 RX ADMIN — FLUTICASONE FUROATE AND VILANTEROL TRIFENATATE 1 PUFF: 200; 25 POWDER RESPIRATORY (INHALATION) at 08:16

## 2021-07-20 RX ADMIN — ACETAMINOPHEN 975 MG: 325 TABLET, FILM COATED ORAL at 14:21

## 2021-07-20 RX ADMIN — FLUTICASONE PROPIONATE 1 SPRAY: 50 SPRAY, METERED NASAL at 08:16

## 2021-07-20 RX ADMIN — AMLODIPINE BESYLATE 5 MG: 5 TABLET ORAL at 08:11

## 2021-07-20 RX ADMIN — LABETALOL HYDROCHLORIDE 400 MG: 200 TABLET, FILM COATED ORAL at 21:18

## 2021-07-20 RX ADMIN — LABETALOL HYDROCHLORIDE 400 MG: 200 TABLET, FILM COATED ORAL at 08:11

## 2021-07-20 RX ADMIN — AMLODIPINE BESYLATE 5 MG: 5 TABLET ORAL at 16:26

## 2021-07-20 RX ADMIN — Medication 1 CAPSULE: at 08:12

## 2021-07-20 RX ADMIN — DULOXETINE HYDROCHLORIDE 30 MG: 30 CAPSULE, DELAYED RELEASE ORAL at 08:11

## 2021-07-20 ASSESSMENT — ACTIVITIES OF DAILY LIVING (ADL)
ADLS_ACUITY_SCORE: 21
ADLS_ACUITY_SCORE: 20
ADLS_ACUITY_SCORE: 20

## 2021-07-20 ASSESSMENT — MIFFLIN-ST. JEOR: SCORE: 970.66

## 2021-07-20 NOTE — PROGRESS NOTES
"   07/20/21 1031   Quick Adds   Type of Visit Initial PT Evaluation   Living Environment   People in home spouse   Current Living Arrangements house   Home Accessibility stairs within home;stairs to enter home   Transportation Anticipated family or friend will provide   Living Environment Comments Per daughter, Yvette, pt resides with her  in a house. They have stairs, but don't need to use them. Daughter assists with laundry and household duties, and is able to stay for longer visits to help 3x per week. The rest of the week the pt's Son stops in to \"lay eyes on them, \" per Daughter (Yvette).     Self-Care   Usual Activity Tolerance moderate   Current Activity Tolerance moderate   Equipment Currently Used at Home none   Activity/Exercise/Self-Care Comment Does not use a cane or a walker, has had no falls that the daughter is aware of until now. Reports the pt is able to dress and complete self cares indepenently.   Disability/Function   Fall history within last six months yes   Number of times patient has fallen within last six months 1   Change in Functional Status Since Onset of Current Illness/Injury no   General Information   Onset of Illness/Injury or Date of Surgery 07/19/21   Referring Physician Angel Julian MD   Patient/Family Therapy Goals Statement (PT) Daughter would like pt to go home, pt wants to go home.   Pertinent History of Current Problem (include personal factors and/or comorbidities that impact the POC) 87-year-old female patient with history noted below including Alzheimer dementia, hypertension, dyslipidemia, history of colon cancer, history of breast cancer, who presented to [facility] today with a fall that was suspected to have occurred last evening. Daughter suspected a fall as pt unable to stand secondary to pain. Pt found to have acute new compression fractures of L1, L2.  Possible mild fractures of L3, lower sacrum.   Existing Precautions/Restrictions fall   General " "Observations Bed rest with bathroom privledges.   Cognition   Orientation Status (Cognition) person   Behavioral Issues combative/physical outbursts;overwhelmed easily;uncooperative;verbal outbursts   Safety Deficit (Cognition) severe deficit;at risk behavior observed;awareness of need for assistance;impulsivity;insight into deficits/self-awareness;judgment;safety precautions awareness;problem-solving   Memory Deficit (Cognition) other (see comments)  (Dementia at baseline)   Pain Assessment   Patient Currently in Pain No   Posture    Posture Forward head position;Protracted shoulders;Kyphosis   Range of Motion (ROM)   ROM Comment Demonstrates normal ROM B UEs and LEs as obs via mobility   Strength   Strength Comments Demonstrates antigravity strength with general mobility, MMTs not assessed as pt not cooperative for assessment.    Bed Mobility   Comment (Bed Mobility) Impulsively sup>sit supervision for safety, pt will not allow assist-swats therapist away. Sit>supine supervision, cued to scoot up in bed pt sits upright and uses UEs to scoot self to HOB. Irritated with cues to do so, no physical assist needed.   Transfers   Transfer Safety Comments Sit<>stand, swats therapist away \"Your too close, I don't need help.\" Daughter present to calm and re-direct as well.   Gait/Stairs (Locomotion)   Comment (Gait/Stairs) Impulsive ambulation bed>bathroom door and back ~ 15' with supervision, pt does not allow close proximity-seemingly steady on feet, no acute LOB, but significant impulsivity and disregard for safety in new environment.   Balance   Balance Comments No acute LOB noted.    Sensory Examination   Sensory Perception Comments Unable to assess   Clinical Impression   Criteria for Skilled Therapeutic Intervention evaluation only;no problems identified which require skilled intervention   Clinical Presentation Stable/Uncomplicated   Clinical Presentation Rationale SeeMR   Clinical Decision Making (Complexity) low " complexity   Therapy Frequency (PT) Evaluation only   PT Discharge Planning    PT Discharge Recommendation (DC Rec) home with assist  (24/7 assist)   PT Rationale for DC Rec Pt presents with confusion, memory deficit, impulsivity-these things contribute to impaired safety with mobility. Pt appears strong-supervision with bed mobility, transfers and gait-no LOB with impuslive walk bed<>door. Continued Physical or Occupational therapy is not likely to change pt's behavior or mobility. Recommend 24/7 supervision in home for optimal safety, family may need to consider memory care if unable to provie 24/7 care. No skilled PT or OT needs identified at this time. Orders completed.    Total Evaluation Time   Total Evaluation Time (Minutes) 12

## 2021-07-20 NOTE — PLAN OF CARE
Occupational Therapy: Orders received. Chart reviewed and discussed with care team.? Occupational Therapy not indicated due to pt not having any skilled therapy needs.  Has good support from family for ADLS, family willing to get extra help as needed..? Defer discharge recommendations to treatment team.? Will complete orders.

## 2021-07-20 NOTE — PLAN OF CARE
Alert, oriented to self only. Confused. Impulsive. VSS on RA. Denies chest pain or SOB. Denies pain this shift. CMS intact. Tolerating reg diet. Incontinent of urine, using purewick. Bedrest with B/R privileges. Up SBA with GB. Ortho following. Plan is MRI brain today with 0.5mg iv Ativan before the test. Daughter Yvette at bedside. Continue to monitor.

## 2021-07-20 NOTE — PROVIDER NOTIFICATION
Dr Hayden hoffman for    Family decided to do MRI brain per daughter Yvette. Requesting low dose ativan before MRI.     Update:  MRI brain ordered by MD.  Iv ativan before the test.     MRI department called for timing. MRI tech will call the floor for updates on timing.

## 2021-07-20 NOTE — PLAN OF CARE
Oriented only to self overnight, forgetful, impulsive at times w/ leaving bed. VSS on RA ex htn. C/o pain w/ movement but none at rest. Currently on bedrest, T/R q2h but up SBA after leaving bed on own. Abrasion to L elbow, covered w/ Mepilex, sacrum/cocyx looks good. Purewick in place w/ adequate output. Tolerating regular diet, takes pills whole w/ water. Discharge pending, ortho declining acute intervention. Slept between cares.

## 2021-07-20 NOTE — PROGRESS NOTES
[facility]    Medicine Progress Note - Hospitalist Service       Date of Admission:  7/19/2021    Assessment & Plan           Mrs. Arjun Chicas is an 87-year-old female patient with history including Alzheimer's dementia, hypertension, dyslipidemia, GERD, depression/anxiety, who presents after suffering a fall and was found with multiple lumbar spine compression fractures and sacral fracture.     Fall with lumbar compression fractures and sacral fracture.  * The patient does have underlying dementia.  Per daughter, the patient had a fall some time the evening prior to presentation.  On initial evaluation here, subsequently on 07/19/2021 was unable to get out of bed due to pain.  On initial evaluation here, she had a head CT which was negative for acute findings.  She had a CT lumbar spine that showed acute L1 and L2 compression fractures with mild spinal canal stenosis; acute L3 posterior endplate fracture.  CT of the pelvis that showed possible subtle fracture of the lower sacrum, which was age indeterminate, but new since 04/2020.    - Continue acetaminophen 3 times a day and also p.r.n.    - Appreciate spine surgery input.  Recommend mobilization as tolerated and no bracing necessary.  No spinal precautions and no procedures planned.  Recommend follow-up with Tova Harmon NP within [facility] for further osteoporosis discussion, such as fall prevention and Vitamin D / Calcium supplementation.  [phone] to schedule.The patient may follow up with me in 6 weeks if pain not improving at that time point, or sooner if develops neurologic changes.  -PT OT recommending home with 24/7 care and assist.  Family aware and able to provide 24/7 supervision in home     Abnormal head CT with 13 mm lucent lesion in the left frontal calvarium with signs of cortical breakthrough.  CT head did not show acute findings, but there was 13 mm lucent lesion in the left frontal calvarium with signs of cortical  breakthrough and there was concern about aggressive features; overall, MRI was recommended.   -MRI contrast part was not completed due to patient's uncooperativeness, limited MRI showed indeterminant left parietal skull lesion which could represent malignant lesion such as metastasis and focus of myeloma.  Updated family of the results.  -Family wants contrast imaging and requests Ativan with MRI to be able to do that     Mild anemia, suspect chronic component.  Hemoglobin today is 10.1.  Hemoglobin was 10.5 on 07/09.  No overt clinical signs of major bleeding.  - Monitor CBC.  - Consider prbc transfusion if hgb </= 7.0 or if significant bleeding with hemodynamic instability or if symptomatic.     Alzheimer dementia.  The patient has memory impairment.  Patient does live with her  in their home.  Daughter helps out multiple times a week.  - Continue prior to admission donepezil.   - Reorient as needed.   - Maintain normal day/night, sleep/wake cycles.  - Minimize sedating medications as able.     Benign essential hypertension.  - Continue prior to admission amlodipine, labetalol and losartan.  -Blood pressure currently slightly high, adjust amlodipine from 5 mg PTA dose to 10 mg  -Monitor and adjust medication as needed     GERD.  - Continue omeprazole.     Dyslipidemia.  - Continue simvastatin.     Depression/anxiety.  - Continue duloxetine.     Diet: Regular Diet Adult    DVT Prophylaxis: Pneumatic Compression Devices  Arroyo Catheter: Not present  Central Lines: None  Code Status: Full Code      Disposition Plan   Expected discharge: 07/21/2021 recommended to prior living arrangement once Evaluation for skull lesion completed.     The patient's care was discussed with the Bedside Nurse, Patient and Patient's Family.  Total unit/floor time 35 minutes . Greater than 50% was spent in counseling with the patient and her daughter by sadia discussion of the results of MRI, plan for follow-up and goals of care  and also safety of patient for discharge.  After discussion about the MRI results the daughter discussed with rest of the family members and decided to proceed with MRI  Paige Blake MD  Hospitalist Service  [facility]  Securely message with the Vocera Web Console (learn more here)  Text page via [employer] Paging/Directory      Risk Factors Present on Admission               ______________________________________________________________________    Interval History   Patient reports feeling better.  She could recognize her daughter.  Updated daughter about finding of MRI brain.  Questions answered.  After the daughter discussed with rest of her siblings the family as a whole wanted to have more information on the MRI and requested MRI with contrast for further evaluation of the skull lesion.  No new nursing concerns.  Patient has been able to ambulate with minimal assist up to the bathroom.  Not needing a lot of pain medication    Data reviewed today: I reviewed all medications, new labs and imaging results over the last 24 hours    Physical Exam   Vital Signs: Temp: 98.3  F (36.8  C) Temp src: Oral BP: (!) 144/56 Pulse: 59   Resp: 17 SpO2: 91 % O2 Device: None (Room air)    Weight: 114 lbs 6.4 oz  Exam:  Constitutional: Awake, alert and no distress. Appears comfortable  Head: Normocephalic. No masses, lesions, tenderness or abnormalities  ENT: ENT exam normal, no neck nodes or sinus tenderness  Cardiovascular: RRR.  No murmurs, no rubs or JVD  Respiratory: Normal WOB,b/l equal air entry, no wheezes or crackles   Gastrointestinal: Abdomen soft, non-tender. BS normal. No masses, organomegaly  : Deferred  Extremities : No edema , no clubbing or cyanosis    Neurologic: Cranial nerves II-XII grossly intact , power symmetrical, Reflexes normal and symmetric. Sensation grossly WNL.      Data   Recent Labs   Lab 07/20/21  0730 07/19/21  1107   WBC 8.2 10.4   HGB 10.5* 10.1*   MCV 92 92    320     140   POTASSIUM 3.7 3.7   CHLORIDE 100 106   CO2 28 29   BUN 12 9   CR 0.99 0.84   ANIONGAP 7 5   WILTON 10.6* 10.3*   GLC 96 87     Recent Results (from the past 24 hour(s))   MR Brain w/o Contrast    Narrative    MR BRAIN WITHOUT CONTRAST  7/19/2021 5:21 PM    INDICATION: Left parietal skull lesion on CT.  TECHNIQUE: Noncontrast MRI of the brain. Patient was unable to remain  still in order to cooperate for the entire exam. No contrast could be  administered.  CONTRAST: None.  COMPARISON: Head CT 7/19/2021    FINDINGS:  The left parietal skull lesion demonstrates mildly  restricted lesion. No restricted diffusion to indicate a recent  infarct. On other sequences the skull lesion demonstrates T1  hypointensity and T2 hyperintensity. Mild mucosal thickening within  the ethmoid air cells. Paranasal sinuses are otherwise free from  significant disease. A few mastoid air cells on the right side contain  a small amount of fluid. Intraorbital contents are unremarkable. There  is mild to moderate generalized prominence of the sulci and  ventricles, consistent with underlying volume loss. Intracranial flow  voids are intact. There is no mass effect, midline shift, or  extraaxial collection. There are scattered foci of T2/FLAIR  hyperintensity within the cerebral white matter that are nonspecific  but most commonly reflect the sequela of chronic small vessel ischemic  disease.      Impression    IMPRESSION:   1.  Patient was unable to cooperate for the entire exam. No contrast  could be administered.  2.  Indeterminate left parietal skull lesion. This could represent a  malignant lesion such as a metastasis or focus of myeloma. If old  imaging exists of this area, comparison would be most useful. In the  absence of this follow-up imaging recommended when the patient can  cooperate for postcontrast imaging.  3.  No acute infarct, hemorrhage, mass or hydrocephalus.   4.  Mild to moderate atrophy and presumed chronic  small vessel  ischemic changes.    CHANDRIKA ZEPEDA MD         SYSTEM ID:  [id number]     Medications       acetaminophen  975 mg Oral Q8H     [START ON 7/21/2021] amLODIPine  10 mg Oral Daily     amLODIPine  5 mg Oral Once     cetirizine  10 mg Oral QPM     donepezil  10 mg Oral At Bedtime     DULoxetine  30 mg Oral QAM     fluticasone  1 spray Both Nostrils QAM     fluticasone-vilanterol  1 puff Inhalation QAM     labetalol  400 mg Oral BID     losartan  100 mg Oral QPM     omeprazole  40 mg Oral QAM     polyethylene glycol  17 g Oral Daily     psyllium  1 capsule Oral BID     sodium chloride (PF)  10 mL Intravenous Once     sodium chloride (PF)  3 mL Intracatheter Q8H

## 2021-07-20 NOTE — PLAN OF CARE
Pt alert to self, intermittent confusion. Became more uncooperative/agitated in the evening. Daughter reports that this is not new with hospital admissions. PTA meds ordered. Ax2 with hovermat for transfers. Bedrest. Purewick in place or incontinence. Pt's daugther refused terrazas order and prefers we try purewick first.

## 2021-07-21 ENCOUNTER — TELEPHONE (OUTPATIENT)
Dept: ONCOLOGY | Facility: CLINIC | Age: 86
End: 2021-07-21

## 2021-07-21 VITALS
RESPIRATION RATE: 16 BRPM | BODY MASS INDEX: 19.49 KG/M2 | HEIGHT: 66 IN | SYSTOLIC BLOOD PRESSURE: 162 MMHG | OXYGEN SATURATION: 93 % | TEMPERATURE: 96.8 F | DIASTOLIC BLOOD PRESSURE: 68 MMHG | HEART RATE: 65 BPM | WEIGHT: 121.25 LBS

## 2021-07-21 PROCEDURE — 250N000013 HC RX MED GY IP 250 OP 250 PS 637: Performed by: INTERNAL MEDICINE

## 2021-07-21 PROCEDURE — 99239 HOSP IP/OBS DSCHRG MGMT >30: CPT | Performed by: INTERNAL MEDICINE

## 2021-07-21 RX ORDER — ACETAMINOPHEN 500 MG
500 TABLET ORAL 2 TIMES DAILY PRN
COMMUNITY
Start: 2021-07-21

## 2021-07-21 RX ORDER — AMLODIPINE BESYLATE 5 MG/1
10 TABLET ORAL DAILY
Qty: 10 TABLET | Refills: 0 | Status: SHIPPED | OUTPATIENT
Start: 2021-07-21 | End: 2021-07-21

## 2021-07-21 RX ORDER — AMLODIPINE BESYLATE 10 MG/1
10 TABLET ORAL DAILY
Qty: 30 TABLET | Refills: 0 | Status: SHIPPED | OUTPATIENT
Start: 2021-07-21

## 2021-07-21 RX ORDER — ACETAMINOPHEN 325 MG/1
975 TABLET ORAL EVERY 8 HOURS
COMMUNITY
Start: 2021-07-21

## 2021-07-21 RX ADMIN — ACETAMINOPHEN 975 MG: 325 TABLET, FILM COATED ORAL at 08:26

## 2021-07-21 RX ADMIN — ACETAMINOPHEN 975 MG: 325 TABLET, FILM COATED ORAL at 00:34

## 2021-07-21 RX ADMIN — POLYETHYLENE GLYCOL 3350 17 G: 17 POWDER, FOR SOLUTION ORAL at 08:33

## 2021-07-21 RX ADMIN — OMEPRAZOLE 40 MG: 20 CAPSULE, DELAYED RELEASE ORAL at 08:26

## 2021-07-21 RX ADMIN — LABETALOL HYDROCHLORIDE 400 MG: 200 TABLET, FILM COATED ORAL at 08:26

## 2021-07-21 RX ADMIN — FLUTICASONE PROPIONATE 1 SPRAY: 50 SPRAY, METERED NASAL at 09:14

## 2021-07-21 RX ADMIN — FLUTICASONE FUROATE AND VILANTEROL TRIFENATATE 1 PUFF: 200; 25 POWDER RESPIRATORY (INHALATION) at 09:14

## 2021-07-21 RX ADMIN — AMLODIPINE BESYLATE 10 MG: 10 TABLET ORAL at 08:26

## 2021-07-21 RX ADMIN — DULOXETINE HYDROCHLORIDE 30 MG: 30 CAPSULE, DELAYED RELEASE ORAL at 08:26

## 2021-07-21 RX ADMIN — Medication 1 CAPSULE: at 08:26

## 2021-07-21 ASSESSMENT — ACTIVITIES OF DAILY LIVING (ADL)
ADLS_ACUITY_SCORE: 21

## 2021-07-21 ASSESSMENT — MIFFLIN-ST. JEOR: SCORE: 1001.75

## 2021-07-21 NOTE — PROVIDER NOTIFICATION
MD Notification    Notified Person: MD    Notified Person Name: KIMMY Blake    Notification Date/Time: 7/21/2021 09:18 a.m.    Notification Interaction: FYI page for MD per call to [facility] they require a referral through [facility] and records to be faxed to Attn: [facility] [phone]     Purpose of Notification: FYI update on oncology work up    Orders Received: no call back unless requested.    Comments:

## 2021-07-21 NOTE — PLAN OF CARE
Oriented to self only, forgetful, [place] w/out hearing aids, impulsive w/ leaving bed. VSS on RA. C/o pain w/ movement, covering w/ scheduled tylenol. Up A1 to BR, turns self well. Voiding adequately, can be incont at times. L elbow abrasion covered. Skin otherwise intact. Discharge pending, likely home w/ 24/7 care. Slept between cares.

## 2021-07-21 NOTE — DISCHARGE SUMMARY
[facility]  Hospitalist Discharge Summary      Date of Admission:  7/19/2021  Date of Discharge:  7/21/2021  Discharging Provider: Paige Blake MD      Discharge Diagnoses   Unwitnessed fall with lumbar compression fracture and sacral fracture  Abnormal head imaging with frontal calvarium lesion  Alzheimer's dementia  Mild anemia  Benign essential hypertension  Gastroesophageal reflux disease  Dyslipidemia  Depression and anxiety    Follow-ups Needed After Discharge   Follow-up Appointments     Follow-up and recommended labs and tests       Follow up with primary care provider, Skylar Douglas, within 7 days for   hospital follow- up.    Follow-up with Tova Harmon NP within [facility] for further osteoporosis   discussion, such as fall prevention and Vitamin D / Calcium   supplementation.  [phone] to schedule  Can follow up with Dr Ivan Dove in 6 weeks if pain not improving at that   time point, or sooner if develops neurologic changes  Follow-up with oncology next available to discuss plan of care for skull   lesion a referral has been submitted to [facility] [phone] they   have been instructed to call Yvette at [phone] to coordinate   appointment date, time and location.           Unresulted Labs Ordered in the Past 30 Days of this Admission     No orders found from 6/19/2021 to 7/20/2021.        Discharge Disposition   Discharged to home  Condition at discharge: Stable    Hospital Course              Mrs. Arjun Chicas is an 87-year-old female patient with history including Alzheimer's dementia, hypertension, dyslipidemia, GERD, depression/anxiety, who presents after suffering a fall and was found with multiple lumbar spine compression fractures and sacral fracture.     Fall with lumbar compression fractures and sacral fracture.  * The patient does have underlying dementia.  Per daughter, the patient had a fall some time the evening prior to presentation.  On initial  evaluation here, subsequently on 07/19/2021 was unable to get out of bed due to pain.  On initial evaluation here, she had a head CT which was negative for acute findings.  She had a CT lumbar spine that showed acute L1 and L2 compression fractures with mild spinal canal stenosis; acute L3 posterior endplate fracture.  CT of the pelvis that showed possible subtle fracture of the lower sacrum, which was age indeterminate, but new since 04/2020.    - Continue acetaminophen 3 times a day and also p.r.n.    - Appreciate spine surgery input.  Recommend mobilization as tolerated and no bracing necessary.  No spinal precautions and no procedures planned.  Recommend follow-up with Tova Harmon NP within [facility] for further osteoporosis discussion, such as fall prevention and Vitamin D / Calcium supplementation.  [phone] to schedule.The patient may follow up with Dr Ivan Dove in 6 weeks if pain not improving at that time point, or sooner if develops neurologic changes.  -PT OT recommending home with 24/7 care and assist.  Family aware and able to provide 24/7 supervision in home     Abnormal head CT with 13 mm lucent lesion in the left frontal calvarium with signs of cortical breakthrough.  CT head did not show acute findings, but there was 13 mm lucent lesion in the left frontal calvarium with signs of cortical breakthrough and there was concern about aggressive features; overall, MRI was recommended.   -MRI contrast part was not completed due to patient's uncooperativeness, limited MRI showed indeterminant left parietal skull lesion which could represent malignant lesion such as metastasis and focus of myeloma.  Updated family of the results.  -Family planning to follow-up with oncology outpatient, referral made prior to discharge     Mild anemia, suspect chronic component.  Hemoglobin today is 10.1.  Hemoglobin was 10.5 on 07/09.  No overt clinical signs of major bleeding.  - Monitor CBC.  - Consider prbc transfusion if  hgb </= 7.0 or if significant bleeding with hemodynamic instability or if symptomatic.     Alzheimer dementia.  The patient has memory impairment.  Patient does live with her  in their home.  Daughter helps out multiple times a week.  - Continue prior to admission donepezil.   - Reorient as needed.   - Maintain normal day/night, sleep/wake cycles.  - Minimize sedating medications as able.     Benign essential hypertension.  - Continue prior to admission amlodipine, labetalol and losartan.  -Blood pressure intermittently high, adjusted to amlodipine from 5 mg PTA dose to 10 mg  -PCP to monitor and adjust medication as needed     GERD.  - Continue omeprazole.     Dyslipidemia.  - Continue simvastatin.     Depression/anxiety.  - Continue duloxetine.     Consultations This Hospital Stay   CARE MANAGEMENT / SOCIAL WORK IP CONSULT  PHYSICAL THERAPY ADULT IP CONSULT  OCCUPATIONAL THERAPY ADULT IP CONSULT  ORTHOPEDIC SURGERY IP CONSULT    Code Status   Full Code    Time Spent on this Encounter   IPaige MD, personally saw the patient today and spent greater than 30 minutes discharging this patient.       Paige Blake MD  [facility] [id number] [facility]  [address] [place] [address], [address] [address]  Phone: [phone]  ______________________________________________________________________    Physical Exam   Vital Signs: Temp: 96.8  F (36  C) Temp src: Oral BP: (!) 162/68 Pulse: 65   Resp: 16 SpO2: 93 % O2 Device: None (Room air)    Weight: 121 lbs 4.05 oz  Exam:  Constitutional: Awake, alert and no distress. Appears comfortable  Head: Normocephalic. No masses, lesions, tenderness or abnormalities  ENT: ENT exam normal, no neck nodes or sinus tenderness  Cardiovascular: RRR.  no murmurs, no rubs or JVD  Respiratory:normal WOB,b/l equal air entry, no wheezes or crackles   Gastrointestinal: Abdomen soft, non-tender. BS normal. No masses, organomegaly  : Deferred  Extremities :no edema , no  clubbing or cyanosis    Neurologic: Cranial nerves II-XII grossly intact , power symmetrical, Reflexes normal and symmetric. Sensation grossly WNL.         Primary Care Physician   Skylar Douglas    Discharge Orders      Oncology/Hematology Adult Referral      Reason for your hospital stay    Fall with subsequent spinal fractures     Follow-up and recommended labs and tests     Follow up with primary care provider, Skylar Douglas, within 7 days for hospital follow- up.   Follow-up with oncology next available to discuss plan of care for skull lesion     Activity    Your activity upon discharge: activity as tolerated     Monitor and record    blood pressure daily and readings to PCP for any medication adjustment     Diet    Follow this diet upon discharge: Orders Placed This Encounter      Regular Diet Adult       Significant Results and Procedures   Results for orders placed or performed during the hospital encounter of 07/19/21   Head CT w/o contrast    Narrative    CT HEAD WITHOUT CONTRAST 7/19/2021 11:21 AM    INDICATION: Head trauma, minor (Age >= 65y).    TECHNIQUE: CT scan of the head without contrast. Dose reduction  techniques were used.  CONTRAST: None.    COMPARISON: None.    FINDINGS:  No skull fracture. No scalp hematoma. No intracranial  hemorrhage, extraaxial collection, mass effect or CT evidence of acute  infarct.  Mild presumed chronic small vessel ischemic changes.  Moderate generalized volume loss. The ventricles are proportional to  the sulci. Calcification of the distal internal carotid arteries  bilaterally. Lucent lesion in the left parietal calvarium measuring up  to 13 mm in diameter is primarily centered in the diploic space.  However, this demonstrates breakthrough through the outer table and  slight breakthrough through the inner table. This suggests the  possibility of aggressive features, and further evaluation with  contrast-enhanced brain MRI recommended. Postoperative changes to  the  bilateral lenses. Paranasal sinuses are free of significant disease.  Clear mastoid air cells.      Impression    IMPRESSION:  1. No acute intracranial abnormality. No skull fracture, no scalp  hematoma, and no intracranial hemorrhage.  2. Moderate diffuse parenchymal volume loss with a mild burden  scattered chronic small vessel ischemic change.  3. 13 mm lucent lesion in the left parietal calvarium primarily  centered in the diploic space. This demonstrates cortical breakthrough  through the overlying outer table, with slight breakthrough through  the inner table. This breakthrough suggests the possibility of  aggressive features, and further evaluation with contrast-enhanced  brain MRI is recommended.    FRANSISCO GONZALEZ MD         SYSTEM ID:  [id number]   Lumbar spine CT w/o contrast    Narrative    CT LUMBAR SPINE WITHOUT CONTRAST  7/19/2021 11:23 AM    INDICATION: Low back pain, trauma.    TECHNIQUE: CT scan of the lumbar spine without contrast. Dose  reduction techniques were used.    COMPARISON: 4/7/2020 CT abdomen and pelvis.    FINDINGS: Five nonrib-bearing lumbar-type vertebrae. Acute two-column  compression fracture of the L1 vertebral body with up to 40-50% loss  of vertebral body height centrally and 4 mm retropulsion along the  posterior inferior L1 vertebral body, contributing to a mild spinal  canal stenosis. No significant extension of this fracture to involve  the posterior elements.    Additional acute compression fracture along the inferior endplate of  L2 with approximately 50% loss of vertebral body height centrally.  Additional 4 mm retropulsion along the posterior inferior L2 vertebral  body contributes to mild spinal canal stenosis. No extension of this  fracture to involve the posterior elements.    Subtle acute fracture along the posterior superior endplate of L3 is  demonstrated on sagittal image 39 and coronal image 23. No significant  accompanying vertebral body height loss. No  additional acute lumbar  spine fracture elsewhere. Small amount of prevertebral edema at the L1  and L2 levels.    Unchanged chronic height loss along the inferior endplate of L5 with  1.7 cm anterolisthesis of L5 on S1. This is accompanied by fusion at  the L5-S1 level and a chronic right L5 pars defect. Disc space heights  are otherwise relatively preserved elsewhere, with vacuum disc  phenomenon at L2-L3. Multilevel small posterior disc bulges with mild  spinal canal stenosis at L1-L2 and L2-L3 relating to retropulsion.  Mild to moderate right and mild left L3-L4 facet arthropathy with  moderate bilateral L4-L5 facet arthropathy. Fusion at the bilateral  L5-S1 facets. Moderate to severe right and moderate left L5-S1 neural  foraminal stenosis, with more mild neural foraminal stenosis  elsewhere. Fatty atrophy of the dorsal paraspinal musculature  inferiorly. Scattered vascular calcification. Remainder negative.      Impression    IMPRESSION:  1.  Acute two-column compression fracture of L1 with approximately  40-50% loss of vertebral body height and 4 mm retropulsion along the  posterior inferior L1 vertebral body, contributing to mild spinal  canal stenosis. This is additionally accompanied by a small amount of  prevertebral posttraumatic fat stranding. No extension of this  fracture to involve the posterior elements.  2. Acute compression fracture along the inferior endplate of L2 with  approximately 50% loss of vertebral body height centrally and mild  retropulsion along the posterior inferior L2 vertebral body,  contributing to mild spinal canal stenosis. Small amount of  accompanying prevertebral fat stranding. No extension to involve the  posterior elements.  3. Subtle acute fracture along the posterior superior endplate of L3  without accompanying vertebral body height loss and without  accompanying retropulsion.  4. Unchanged 1.7 cm anterolisthesis of L5 on S1 with unchanged chronic  height loss along the  inferior endplate of L5 and redemonstrated  fusion across the L5-S1 facets and disc space.  5. Diffuse osteopenia with degenerative change as above. No high-grade  spinal canal stenosis. Neural foraminal stenosis is as detailed above.    FRANSISCO GONZALEZ MD         SYSTEM ID:  [id number]   CT Pelvis Bone wo Contrast    Narrative    CT PELVIS BONE WITHOUT CONTRAST  7/19/2021 11:25 AM     HISTORY: Pelvic trauma with pain.    TECHNIQUE:  Axial images with reconstructions. No IV contrast.  Radiation dose for this scan was reduced using automated exposure  control, adjustment of the mA and/or kV according to patient size, or  iterative reconstruction technique.    COMPARISON:  None    FINDINGS:  Mild bilateral sacroiliac arthropathy. Lower lumbar spine  degenerative change. Lumbosacral spondylolisthesis. Fracture of the  left L4 transverse process (assuming five lumbar type vertebrae). At  the ventral aspect of the S4 level, there is mild cortical buckling,  not present on body CT dated 4/7/2020. Therefore this is felt to  represent a focal fracture (age-indeterminate, but new since April 2020). Colonic diverticulosis. Right ovarian/adnexal cystic lesion;  this has not changed significantly since April 2020 and has been  present dating back to July 2006.      Impression    IMPRESSION:  1. Subtle fracture of the lower sacrum (age-indeterminate, but new  since April 2020).  2. Fracture of the left L4 transverse process.  3. Additional findings discussed above.    ARLEN THORNE MD         SYSTEM ID:  [id number]   MR Brain w/o Contrast    Narrative    MR BRAIN WITHOUT CONTRAST  7/19/2021 5:21 PM    INDICATION: Left parietal skull lesion on CT.  TECHNIQUE: Noncontrast MRI of the brain. Patient was unable to remain  still in order to cooperate for the entire exam. No contrast could be  administered.  CONTRAST: None.  COMPARISON: Head CT 7/19/2021    FINDINGS:  The left parietal skull lesion demonstrates mildly  restricted lesion.  No restricted diffusion to indicate a recent  infarct. On other sequences the skull lesion demonstrates T1  hypointensity and T2 hyperintensity. Mild mucosal thickening within  the ethmoid air cells. Paranasal sinuses are otherwise free from  significant disease. A few mastoid air cells on the right side contain  a small amount of fluid. Intraorbital contents are unremarkable. There  is mild to moderate generalized prominence of the sulci and  ventricles, consistent with underlying volume loss. Intracranial flow  voids are intact. There is no mass effect, midline shift, or  extraaxial collection. There are scattered foci of T2/FLAIR  hyperintensity within the cerebral white matter that are nonspecific  but most commonly reflect the sequela of chronic small vessel ischemic  disease.      Impression    IMPRESSION:   1.  Patient was unable to cooperate for the entire exam. No contrast  could be administered.  2.  Indeterminate left parietal skull lesion. This could represent a  malignant lesion such as a metastasis or focus of myeloma. If old  imaging exists of this area, comparison would be most useful. In the  absence of this follow-up imaging recommended when the patient can  cooperate for postcontrast imaging.  3.  No acute infarct, hemorrhage, mass or hydrocephalus.   4.  Mild to moderate atrophy and presumed chronic small vessel  ischemic changes.    CHANDRIKA ZEPEDA MD         SYSTEM ID:  [id number]       Discharge Medications   Current Discharge Medication List      START taking these medications    Details   !! acetaminophen (TYLENOL) 325 MG tablet Take 3 tablets (975 mg) by mouth every 8 hours    Associated Diagnoses: Fall, subsequent encounter      !! acetaminophen (TYLENOL) 500 MG tablet Take 1 tablet (500 mg) by mouth 2 times daily as needed for mild pain or fever    Associated Diagnoses: Fall, subsequent encounter       !! - Potential duplicate medications found. Please discuss with provider.       CONTINUE these medications which have CHANGED    Details   amLODIPine (NORVASC) 10 MG tablet Take 1 tablet (10 mg) by mouth daily  Qty: 30 tablet, Refills: 0    Associated Diagnoses: Benign essential hypertension         CONTINUE these medications which have NOT CHANGED    Details   carboxymethylcellulose PF (REFRESH PLUS) 0.5 % ophthalmic solution Place 1 drop into both eyes 4 times daily as needed for dry eyes      cetirizine (ZYRTEC) 10 MG tablet Take 10 mg by mouth every evening      donepezil (ARICEPT) 10 MG tablet Take 10 mg by mouth At Bedtime      DULoxetine (CYMBALTA) 30 MG capsule Take 30 mg by mouth every morning       fluticasone (FLONASE) 50 MCG/ACT nasal spray Spray 1 spray into both nostrils every morning      fluticasone-vilanterol (BREO ELLIPTA) 200-25 MCG/INH inhaler Inhale 1 puff into the lungs every morning      ibuprofen (ADVIL/MOTRIN) 200 MG tablet Take 800 mg by mouth every 6 hours as needed for mild pain      labetalol (NORMODYNE) 200 MG tablet Take 400 mg by mouth 2 times daily 2 x 200 mg       losartan (COZAAR) 100 MG tablet Take 100 mg by mouth every evening      omeprazole (PRILOSEC) 40 MG DR capsule Take 40 mg by mouth every morning       polyethylene glycol (MIRALAX) 17 GM/Dose powder Take 17 g (1 capful) by mouth daily  Qty:      Associated Diagnoses: Gastrointestinal hemorrhage, unspecified gastrointestinal hemorrhage type      psyllium (METAMUCIL/KONSYL) capsule Take 2 capsules by mouth daily  Qty: 180 capsule, Refills: 3    Comments: Pharmacy to dispense capsule size that is available.  Associated Diagnoses: Gastrointestinal hemorrhage, unspecified gastrointestinal hemorrhage type      simvastatin (ZOCOR) 40 MG tablet Take 20 mg by mouth At Bedtime 1/2 x 40 mg tab           Allergies   No Known Allergies

## 2021-07-21 NOTE — TELEPHONE ENCOUNTER
Monday, 8-2-2021:  Yvette Craig calls in.  Her mom, Mercedes saw her PCP for hospital f/u and the PCP is encouraging the family to pursue a biopsy of the skull based lesion recently found on a MRI.   Yvette is also requesting that the first consult be a video visit as it is very difficult for her mom to get around,..  I said I am not sure if they are able to do a virtual visit, but will ask.  I have called the [facility] at [place] and have sent an in-basket requesting they review mercedes's chart and schedule if this is something they can assist with.  I am updating Yvette as well.    Thursday, 7-:  DaughterYvette called.  We talked for a while about mom's situation, whether or not to pursue further work-up of her parietal skull lesion, etc.  She appreciated talking through this and answering her questions.  They see her mom's GP in the next couple of weeks and can talk to her as well.    I tried to call dtrPao back today (Monday, 7-26) but unfortunately, she is in the hospital now according to her .  I then called son, Griffin.  He said the family is still trying to make a decision.  He said mom is doing better, less confused, less pain but they are not sure if they want to pursue further work-up and/or treatment.  He has my number and will let me know if they have any questions and/or make a decision.    Called Pao craig regarding medical oncology referral rec'd.  Pao said there are 5 children in the family and she needs to discuss with all of them.  We discussed that actually the first step would be to have mom see a neurosurgeon to consult and set up a biopsy date.  From there after a confirmed diagnosis we would then schedule mom with an oncologist. Pao said their niece, a nursing student,  is coming from [place] on Friday to help care for her grandmother.    Pao asked that I call her back on Monday and Pao will have a confirmed decision from all of the siblings by then.

## 2021-07-21 NOTE — PLAN OF CARE
Oriented to self only, forgetful & [place]. Can be impulsive. VSS on RA. Denies pain/nausea. Bedrest w/ BR privileges. A1 to BR, incontinent at times. T/R, pt turns self often. Skin intact ex L elbow abrasion covered w/ mepilex. Regular diet, tolerating well & takes pills whole w/ water. Discharge pending.

## 2021-07-21 NOTE — CONSULTS
Care Management Discharge Note    Discharge Date: 07/21/2021       Discharge Disposition: Home with family 24/7 and prior to admission private duty PCA  Discharge Services: PCA (private duty per family and family assist)    Discharge DME: None    Discharge Transportation: family or friend will provide    Private pay costs discussed: Not applicable    PAS Confirmation Code: n/a   Patient/family educated on Medicare website which has current facility and service quality ratings: yes    Education Provided on the Discharge Plan:  yes  Persons Notified of Discharge Plans: Discharging Hospitalist, patient's daughter Yvette at bedside and bedside RN  Patient/Family in Agreement with the Plan: yes    Handoff Referral Completed: Yes  [facility] referral placed and orders faxed to [phone]  Additional Information:  Writer met with patient (who was sleeping during consult) and daughter Yvette at the bedside. Per Yvette they are able to provide 24/7 assist to the patient.  Yvette is aware that a referral is being submitted to [facility] for follow up she is the primary contact for scheduling.  Yvette is aware that the [facility]  would contact with date time and location once referral is reviewed.  Yvette is aware that [facility] information if needed for consult will be put in discharge orders.  No further needs identified.  Bedside aware that patient's son is coming ~14:30 for discharge.         Zohra Aguilar RN

## 2021-07-22 ENCOUNTER — PATIENT OUTREACH (OUTPATIENT)
Dept: CARE COORDINATION | Facility: CLINIC | Age: 86
End: 2021-07-22

## 2021-07-22 DIAGNOSIS — Z71.89 OTHER SPECIFIED COUNSELING: ICD-10-CM

## 2021-07-22 NOTE — DISCHARGE SUMMARY
Discharge Note    Patient discharged to home via private vehicle  accompanied by daughter.  Discontinued IV  Prescriptions N/A. Filled amlodipine but family didn't want, RN returned to discharge pharmacy  Belongings reviewed and sent with family.   Home medications returned to patient: Yes  Equipment sent with: patient, N/A.   family verbalizes understanding of discharge instructions. AVS given to patient.

## 2021-08-06 ENCOUNTER — OFFICE VISIT (OUTPATIENT)
Dept: NEUROSURGERY | Facility: CLINIC | Age: 86
End: 2021-08-06
Attending: NURSE PRACTITIONER
Payer: COMMERCIAL

## 2021-08-06 VITALS
HEART RATE: 54 BPM | BODY MASS INDEX: 21.48 KG/M2 | HEIGHT: 63 IN | SYSTOLIC BLOOD PRESSURE: 116 MMHG | TEMPERATURE: 97.5 F | OXYGEN SATURATION: 94 % | DIASTOLIC BLOOD PRESSURE: 56 MMHG

## 2021-08-06 DIAGNOSIS — M89.9 SKULL LESION: Primary | ICD-10-CM

## 2021-08-06 PROCEDURE — G0463 HOSPITAL OUTPT CLINIC VISIT: HCPCS

## 2021-08-06 PROCEDURE — 99203 OFFICE O/P NEW LOW 30 MIN: CPT | Performed by: NURSE PRACTITIONER

## 2021-08-06 ASSESSMENT — PAIN SCALES - GENERAL: PAINLEVEL: EXTREME PAIN (9)

## 2021-08-06 NOTE — LETTER
8/6/2021         RE: Verito Chicas  [address]  [facility] [address]        Dear Colleague,    Thank you for referring your patient, Verito Chicas, to the [facility] [place]. Please see a copy of my visit note below.    Dr. Leonel Cedeño  [facility] Neurosurgery     CC: skull lesion     Primary care Provider: Skylar Douglas      HPI: Verito Chicas is an 87 year old female with history of Alzheimer's disease and breast cancer, s/p lumpectomy and radiation in ~1996. She has not seen Oncology since then. She presents with her family for evaluation of skull lesion that was found on imaging during recent hospitalization on 7/19/21. Patient had presented to the ED that day after an unwitnessed fall. MRI brain with indeterminate left parietal skull lesion. Patient was instructed to follow-up with Oncology. Daughter spoke to Oncology and they recommended evaluation with neurosurgery for possible biopsy of skull lesion. Today, patient reports some occasional dizziness, but denies any headaches, nausea, vomiting, speech changes, vision changes, or seizure activity. Family states patient has been more fatigued lately. Patient also reports back pain and unsteady gait; L1, L2, L3 compression fractures found on imaging during hospitalization. Patient is following with Dr. Dove at [facility] for spine fractures.     Patient lives independently with .     Past Medical History:   Diagnosis Date     Alzheimer disease (H)      Cancer (H)     breast cancer     GERD (gastroesophageal reflux disease)      Hypertension        Past Medical History reviewed with patient during visit.    Past Surgical History:   Procedure Laterality Date     APPENDECTOMY       BIOPSY      lumpectomy right breast     COLONOSCOPY N/A 1/20/2016    Procedure: COMBINED COLONOSCOPY, SINGLE OR MULTIPLE BIOPSY/POLYPECTOMY BY BIOPSY;  Surgeon: Flynn Pineda MD;  Location:  GI     COLONOSCOPY N/A  10/14/2019    Procedure: COLONOSCOPY, WITH POLYPECTOMY AND BIOPSY;  Surgeon: Rere Leigh MD;  Location:  GI     EXTRACTION(S) DENTAL N/A 8/20/2015    Procedure: EXTRACTION(S) DENTAL;  Surgeon: Fahad Webster DDS;  Location:  OR     EYE SURGERY      cataract bilat     ORTHOPEDIC SURGERY      arthroscopy knee left     ORTHOPEDIC SURGERY      shoulder surg, fx left     ORTHOPEDIC SURGERY      fx wrist left     Past Surgical History reviewed with patient during visit.    Current Outpatient Medications   Medication     acetaminophen (TYLENOL) 325 MG tablet     acetaminophen (TYLENOL) 500 MG tablet     amLODIPine (NORVASC) 10 MG tablet     carboxymethylcellulose PF (REFRESH PLUS) 0.5 % ophthalmic solution     cetirizine (ZYRTEC) 10 MG tablet     donepezil (ARICEPT) 10 MG tablet     DULoxetine (CYMBALTA) 30 MG capsule     fluticasone (FLONASE) 50 MCG/ACT nasal spray     fluticasone-vilanterol (BREO ELLIPTA) 200-25 MCG/INH inhaler     ibuprofen (ADVIL/MOTRIN) 200 MG tablet     labetalol (NORMODYNE) 200 MG tablet     losartan (COZAAR) 100 MG tablet     omeprazole (PRILOSEC) 40 MG DR capsule     polyethylene glycol (MIRALAX) 17 GM/Dose powder     simvastatin (ZOCOR) 40 MG tablet     psyllium (METAMUCIL/KONSYL) capsule     No current facility-administered medications for this visit.       No Known Allergies    Social History     Socioeconomic History     Marital status:      Spouse name: Not on file     Number of children: Not on file     Years of education: Not on file     Highest education level: Not on file   Occupational History     Not on file   Tobacco Use     Smoking status: Light Tobacco Smoker     Packs/day: 0.25     Types: Cigarettes     Smokeless tobacco: Never Used   Substance and Sexual Activity     Alcohol use: No     Drug use: No     Sexual activity: Not on file   Other Topics Concern     Parent/sibling w/ CABG, MI or angioplasty before 65F 55M? Not Asked   Social History Narrative     Not  "on file     Social Determinants of Health     Financial Resource Strain:      Difficulty of Paying Living Expenses:    Food Insecurity:      Worried About Running Out of Food in the Last Year:      Ran Out of Food in the Last Year:    Transportation Needs:      Lack of Transportation (Medical):      Lack of Transportation (Non-Medical):    Physical Activity:      Days of Exercise per Week:      Minutes of Exercise per Session:    Stress:      Feeling of Stress :    Social Connections:      Frequency of Communication with Friends and Family:      Frequency of Social Gatherings with Friends and Family:      Attends Jewish Services:      Active Member of Clubs or Organizations:      Attends Club or Organization Meetings:      Marital Status:    Intimate Partner Violence:      Fear of Current or Ex-Partner:      Emotionally Abused:      Physically Abused:      Sexually Abused:        Family History   Problem Relation Age of Onset     Colon Cancer Brother        ROS: 10 point ROS neg other than the symptoms noted above in the HPI.    Vital Signs: /56   Pulse 54   Temp 97.5  F (36.4  C) (Oral)   Ht 5' 3\" (1.6 m)   SpO2 94%   BMI 21.48 kg/m      Examination:  Neurological:  Awake  Alert  Oriented to self, baseline Alzheimer's disease   Speech clear  Cranial nerves II - XII intact  PERRL  EOMI  Face symmetric  Tongue midline  Motor exam: 5/5 strength in all four extremities.   Sensation intact   Finger to Nose smooth   Pronator drift negative   Gait: Patient in wheelchair     Imaging:   MR BRAIN WITHOUT CONTRAST  7/19/2021 5:21 PM                                                                 IMPRESSION:   1.  Patient was unable to cooperate for the entire exam. No contrast  could be administered.  2.  Indeterminate left parietal skull lesion. This could represent a  malignant lesion such as a metastasis or focus of myeloma. If old  imaging exists of this area, comparison would be most useful. In the  absence " of this follow-up imaging recommended when the patient can  cooperate for postcontrast imaging.  3.  No acute infarct, hemorrhage, mass or hydrocephalus.   4.  Mild to moderate atrophy and presumed chronic small vessel  ischemic changes.    Assessment/Plan:   Left parietal skull lesion  History of breast cancer    87 year old female with history of Alzheimer's disease and breast cancer, s/p lumpectomy and radiation in ~1996. She presents with her family for evaluation of skull lesion that was found on imaging during recent hospitalization on 7/19/21. MRI brain with indeterminate left parietal skull lesion. Today, patient reports some occasional dizziness, but denies any headaches, nausea, vomiting, speech changes, vision changes, or seizure activity. Family states patient has been more fatigued lately. Imaging reviewed with patient, family, and Dr. Cedeño. Recommend follow-up in 3 months with repeat brain MRI. Patient/family requesting Valium Rx to take prior to MRI. Recommend continued follow-up with [facility] regarding spine fractures.     Discussed red flag symptoms and advised to seek medical attention with any increased headaches, dizziness, nausea/vomiting, vision/speech changes, weakness, confusion, seizure activity, or other neurological changes. Patient's family voiced understanding and agreement with this plan.     Melissa Peralta CNP  [facility] [place] [address]  Tel [phone]  Pager [phone]            Again, thank you for allowing me to participate in the care of your patient.        Sincerely,        Melissa Peralta, ROGER

## 2021-08-06 NOTE — PROGRESS NOTES
Dr. Leonel WONG [facility] Neurosurgery     CC: skull lesion     Primary care Provider: Skylar Douglas      HPI: Verito Chicas is an 87 year old female with history of Alzheimer's disease and breast cancer, s/p lumpectomy and radiation in ~1996. She has not seen Oncology since then. She presents with her family for evaluation of skull lesion that was found on imaging during recent hospitalization on 7/19/21. Patient had presented to the ED that day after an unwitnessed fall. MRI brain with indeterminate left parietal skull lesion. Patient was instructed to follow-up with Oncology. Daughter spoke to Oncology and they recommended evaluation with neurosurgery for possible biopsy of skull lesion. Today, patient reports some occasional dizziness, but denies any headaches, nausea, vomiting, speech changes, vision changes, or seizure activity. Family states patient has been more fatigued lately. Patient also reports back pain and unsteady gait; L1, L2, L3 compression fractures found on imaging during hospitalization. Patient is following with Dr. Dove at [facility] for spine fractures.     Patient lives independently with .     Past Medical History:   Diagnosis Date     Alzheimer disease (H)      Cancer (H)     breast cancer     GERD (gastroesophageal reflux disease)      Hypertension        Past Medical History reviewed with patient during visit.    Past Surgical History:   Procedure Laterality Date     APPENDECTOMY       BIOPSY      lumpectomy right breast     COLONOSCOPY N/A 1/20/2016    Procedure: COMBINED COLONOSCOPY, SINGLE OR MULTIPLE BIOPSY/POLYPECTOMY BY BIOPSY;  Surgeon: Flynn Pineda MD;  Location:  GI     COLONOSCOPY N/A 10/14/2019    Procedure: COLONOSCOPY, WITH POLYPECTOMY AND BIOPSY;  Surgeon: Rere Leigh MD;  Location:  GI     EXTRACTION(S) DENTAL N/A 8/20/2015    Procedure: EXTRACTION(S) DENTAL;  Surgeon: Fahad Webster DDS;  Location:  OR     EYE SURGERY       cataract bilat     ORTHOPEDIC SURGERY      arthroscopy knee left     ORTHOPEDIC SURGERY      shoulder surg, fx left     ORTHOPEDIC SURGERY      fx wrist left     Past Surgical History reviewed with patient during visit.    Current Outpatient Medications   Medication     acetaminophen (TYLENOL) 325 MG tablet     acetaminophen (TYLENOL) 500 MG tablet     amLODIPine (NORVASC) 10 MG tablet     carboxymethylcellulose PF (REFRESH PLUS) 0.5 % ophthalmic solution     cetirizine (ZYRTEC) 10 MG tablet     donepezil (ARICEPT) 10 MG tablet     DULoxetine (CYMBALTA) 30 MG capsule     fluticasone (FLONASE) 50 MCG/ACT nasal spray     fluticasone-vilanterol (BREO ELLIPTA) 200-25 MCG/INH inhaler     ibuprofen (ADVIL/MOTRIN) 200 MG tablet     labetalol (NORMODYNE) 200 MG tablet     losartan (COZAAR) 100 MG tablet     omeprazole (PRILOSEC) 40 MG DR capsule     polyethylene glycol (MIRALAX) 17 GM/Dose powder     simvastatin (ZOCOR) 40 MG tablet     psyllium (METAMUCIL/KONSYL) capsule     No current facility-administered medications for this visit.       No Known Allergies    Social History     Socioeconomic History     Marital status:      Spouse name: Not on file     Number of children: Not on file     Years of education: Not on file     Highest education level: Not on file   Occupational History     Not on file   Tobacco Use     Smoking status: Light Tobacco Smoker     Packs/day: 0.25     Types: Cigarettes     Smokeless tobacco: Never Used   Substance and Sexual Activity     Alcohol use: No     Drug use: No     Sexual activity: Not on file   Other Topics Concern     Parent/sibling w/ CABG, MI or angioplasty before 65F 55M? Not Asked   Social History Narrative     Not on file     Social Determinants of Health     Financial Resource Strain:      Difficulty of Paying Living Expenses:    Food Insecurity:      Worried About Running Out of Food in the Last Year:      Ran Out of Food in the Last Year:    Transportation Needs:       "Lack of Transportation (Medical):      Lack of Transportation (Non-Medical):    Physical Activity:      Days of Exercise per Week:      Minutes of Exercise per Session:    Stress:      Feeling of Stress :    Social Connections:      Frequency of Communication with Friends and Family:      Frequency of Social Gatherings with Friends and Family:      Attends Muslim Services:      Active Member of Clubs or Organizations:      Attends Club or Organization Meetings:      Marital Status:    Intimate Partner Violence:      Fear of Current or Ex-Partner:      Emotionally Abused:      Physically Abused:      Sexually Abused:        Family History   Problem Relation Age of Onset     Colon Cancer Brother        ROS: 10 point ROS neg other than the symptoms noted above in the HPI.    Vital Signs: /56   Pulse 54   Temp 97.5  F (36.4  C) (Oral)   Ht 5' 3\" (1.6 m)   SpO2 94%   BMI 21.48 kg/m      Examination:  Neurological:  Awake  Alert  Oriented to self, baseline Alzheimer's disease   Speech clear  Cranial nerves II - XII intact  PERRL  EOMI  Face symmetric  Tongue midline  Motor exam: 5/5 strength in all four extremities.   Sensation intact   Finger to Nose smooth   Pronator drift negative   Gait: Patient in wheelchair     Imaging:   MR BRAIN WITHOUT CONTRAST  7/19/2021 5:21 PM                                                                 IMPRESSION:   1.  Patient was unable to cooperate for the entire exam. No contrast  could be administered.  2.  Indeterminate left parietal skull lesion. This could represent a  malignant lesion such as a metastasis or focus of myeloma. If old  imaging exists of this area, comparison would be most useful. In the  absence of this follow-up imaging recommended when the patient can  cooperate for postcontrast imaging.  3.  No acute infarct, hemorrhage, mass or hydrocephalus.   4.  Mild to moderate atrophy and presumed chronic small vessel  ischemic changes.    Assessment/Plan: "   Left parietal skull lesion  History of breast cancer    87 year old female with history of Alzheimer's disease and breast cancer, s/p lumpectomy and radiation in ~1996. She presents with her family for evaluation of skull lesion that was found on imaging during recent hospitalization on 7/19/21. MRI brain with indeterminate left parietal skull lesion. Today, patient reports some occasional dizziness, but denies any headaches, nausea, vomiting, speech changes, vision changes, or seizure activity. Family states patient has been more fatigued lately. Imaging reviewed with patient, family, and Dr. Cedeño. Recommend follow-up in 3 months with repeat brain MRI. Patient/family requesting Valium Rx to take prior to MRI. Recommend continued follow-up with [facility] regarding spine fractures.     Discussed red flag symptoms and advised to seek medical attention with any increased headaches, dizziness, nausea/vomiting, vision/speech changes, weakness, confusion, seizure activity, or other neurological changes. Patient's family voiced understanding and agreement with this plan.     Melissa Peralta CNP  [facility] Neurosurgery  [address] [address]  Tel [phone]  Pager [phone]

## 2021-08-06 NOTE — PATIENT INSTRUCTIONS
- Follow up in 3 months with repeat brain MRI prior   - Follow up with [facility] regarding spine fractures   - Call our clinic with any increased headaches, dizziness, nausea/vomiting, vision/speech changes, weakness, confusion, seizure activity, or other neurological changes.

## 2021-08-26 ENCOUNTER — PATIENT OUTREACH (OUTPATIENT)
Dept: CARE COORDINATION | Facility: CLINIC | Age: 86
End: 2021-08-26

## 2021-08-26 NOTE — PROGRESS NOTES
[facility] [place], requested support for hospice providers for family. [facility] emailed resource of:  [email or web address]  Pt. Is out scope for CC.

## 2021-09-17 ENCOUNTER — TELEPHONE (OUTPATIENT)
Dept: ONCOLOGY | Facility: CLINIC | Age: 86
End: 2021-09-17

## (undated) RX ORDER — FENTANYL CITRATE 50 UG/ML
INJECTION, SOLUTION INTRAMUSCULAR; INTRAVENOUS
Status: DISPENSED
Start: 2019-03-11

## (undated) RX ORDER — FENTANYL CITRATE 50 UG/ML
INJECTION, SOLUTION INTRAMUSCULAR; INTRAVENOUS
Status: DISPENSED
Start: 2019-06-17

## (undated) RX ORDER — FENTANYL CITRATE 50 UG/ML
INJECTION, SOLUTION INTRAMUSCULAR; INTRAVENOUS
Status: DISPENSED
Start: 2019-10-14